# Patient Record
Sex: MALE | Race: WHITE | Employment: UNEMPLOYED | URBAN - METROPOLITAN AREA
[De-identification: names, ages, dates, MRNs, and addresses within clinical notes are randomized per-mention and may not be internally consistent; named-entity substitution may affect disease eponyms.]

---

## 2018-05-28 ENCOUNTER — HOSPITAL ENCOUNTER (EMERGENCY)
Age: 28
Discharge: PSYCHIATRIC OR SUBSTANCE USE FACILITY | End: 2018-05-29
Attending: EMERGENCY MEDICINE
Payer: COMMERCIAL

## 2018-05-28 DIAGNOSIS — F31.10 BIPOLAR DISORDER, MANIC (HCC): ICD-10-CM

## 2018-05-28 DIAGNOSIS — F29 PSYCHOSIS, UNSPECIFIED PSYCHOSIS TYPE (HCC): Primary | ICD-10-CM

## 2018-05-28 PROCEDURE — 99285 EMERGENCY DEPT VISIT HI MDM: CPT

## 2018-05-28 PROCEDURE — 90791 PSYCH DIAGNOSTIC EVALUATION: CPT

## 2018-05-28 NOTE — LETTER
[address] 
[address] [place] [id number] 
[phone] Work/School Note Date: 5/28/2018 To Whom It May concern: 
 
Phillip Asencio was seen and treated today in the emergency room by the following provider(s): 
Attending Provider: Ruth Osler, MD 
Physician Assistant: SANTIAGO Hoyt. Sincerely, Rubia Barrett

## 2018-05-29 VITALS
HEIGHT: 68 IN | WEIGHT: 178 LBS | RESPIRATION RATE: 18 BRPM | SYSTOLIC BLOOD PRESSURE: 114 MMHG | HEART RATE: 56 BPM | DIASTOLIC BLOOD PRESSURE: 70 MMHG | TEMPERATURE: 97.5 F | OXYGEN SATURATION: 100 % | BODY MASS INDEX: 26.98 KG/M2

## 2018-05-29 LAB
ALBUMIN SERPL-MCNC: 3.8 G/DL (ref 3.5–5)
ALBUMIN/GLOB SERPL: 1 {RATIO} (ref 1.1–2.2)
ALP SERPL-CCNC: 63 U/L (ref 45–117)
ALT SERPL-CCNC: 24 U/L (ref 12–78)
AMPHET UR QL SCN: NEGATIVE
ANION GAP SERPL CALC-SCNC: 8 MMOL/L (ref 5–15)
APAP SERPL-MCNC: <2 UG/ML (ref 10–30)
APPEARANCE UR: CLEAR
AST SERPL-CCNC: 19 U/L (ref 15–37)
ATRIAL RATE: 43 BPM
BACTERIA URNS QL MICRO: NEGATIVE /HPF
BARBITURATES UR QL SCN: NEGATIVE
BASOPHILS # BLD: 0 K/UL (ref 0–0.1)
BASOPHILS NFR BLD: 1 % (ref 0–1)
BENZODIAZ UR QL: NEGATIVE
BILIRUB SERPL-MCNC: 0.3 MG/DL (ref 0.2–1)
BILIRUB UR QL: NEGATIVE
BUN SERPL-MCNC: 9 MG/DL (ref 6–20)
BUN/CREAT SERPL: 9 (ref 12–20)
CALCIUM SERPL-MCNC: 8.8 MG/DL (ref 8.5–10.1)
CALCULATED P AXIS, ECG09: 12 DEGREES
CALCULATED R AXIS, ECG10: 55 DEGREES
CALCULATED T AXIS, ECG11: 34 DEGREES
CANNABINOIDS UR QL SCN: POSITIVE
CHLORIDE SERPL-SCNC: 110 MMOL/L (ref 97–108)
CO2 SERPL-SCNC: 24 MMOL/L (ref 21–32)
COCAINE UR QL SCN: NEGATIVE
COLOR UR: ABNORMAL
CREAT SERPL-MCNC: 0.98 MG/DL (ref 0.7–1.3)
DATE LAST DOSE: ABNORMAL
DIAGNOSIS, 93000: NORMAL
DIFFERENTIAL METHOD BLD: NORMAL
DRUG SCRN COMMENT,DRGCM: ABNORMAL
EOSINOPHIL # BLD: 0.1 K/UL (ref 0–0.4)
EOSINOPHIL NFR BLD: 1 % (ref 0–7)
EPITH CASTS URNS QL MICRO: ABNORMAL /LPF
ERYTHROCYTE [DISTWIDTH] IN BLOOD BY AUTOMATED COUNT: 11.5 % (ref 11.5–14.5)
ETHANOL SERPL-MCNC: 16 MG/DL
GLOBULIN SER CALC-MCNC: 3.8 G/DL (ref 2–4)
GLUCOSE SERPL-MCNC: 87 MG/DL (ref 65–100)
GLUCOSE UR STRIP.AUTO-MCNC: NEGATIVE MG/DL
HCT VFR BLD AUTO: 40.4 % (ref 36.6–50.3)
HGB BLD-MCNC: 13.3 G/DL (ref 12.1–17)
HGB UR QL STRIP: NEGATIVE
HYALINE CASTS URNS QL MICRO: ABNORMAL /LPF (ref 0–5)
IMM GRANULOCYTES # BLD: 0 K/UL (ref 0–0.04)
IMM GRANULOCYTES NFR BLD AUTO: 0 % (ref 0–0.5)
KETONES UR QL STRIP.AUTO: NEGATIVE MG/DL
LEUKOCYTE ESTERASE UR QL STRIP.AUTO: NEGATIVE
LITHIUM SERPL-SCNC: 0.58 MMOL/L (ref 0.6–1.2)
LYMPHOCYTES # BLD: 2.3 K/UL (ref 0.8–3.5)
LYMPHOCYTES NFR BLD: 36 % (ref 12–49)
MCH RBC QN AUTO: 31.7 PG (ref 26–34)
MCHC RBC AUTO-ENTMCNC: 32.9 G/DL (ref 30–36.5)
MCV RBC AUTO: 96.4 FL (ref 80–99)
METHADONE UR QL: NEGATIVE
MONOCYTES # BLD: 0.4 K/UL (ref 0–1)
MONOCYTES NFR BLD: 6 % (ref 5–13)
NEUTS SEG # BLD: 3.6 K/UL (ref 1.8–8)
NEUTS SEG NFR BLD: 56 % (ref 32–75)
NITRITE UR QL STRIP.AUTO: NEGATIVE
NRBC # BLD: 0 K/UL (ref 0–0.01)
NRBC BLD-RTO: 0 PER 100 WBC
OPIATES UR QL: NEGATIVE
P-R INTERVAL, ECG05: 188 MS
PCP UR QL: NEGATIVE
PH UR STRIP: 7 [PH] (ref 5–8)
PLATELET # BLD AUTO: 179 K/UL (ref 150–400)
PMV BLD AUTO: 11.2 FL (ref 8.9–12.9)
POTASSIUM SERPL-SCNC: 3.6 MMOL/L (ref 3.5–5.1)
PROT SERPL-MCNC: 7.6 G/DL (ref 6.4–8.2)
PROT UR STRIP-MCNC: ABNORMAL MG/DL
Q-T INTERVAL, ECG07: 504 MS
QRS DURATION, ECG06: 110 MS
QTC CALCULATION (BEZET), ECG08: 425 MS
RBC # BLD AUTO: 4.19 M/UL (ref 4.1–5.7)
RBC #/AREA URNS HPF: ABNORMAL /HPF (ref 0–5)
REPORTED DOSE,DOSE: ABNORMAL UNITS
REPORTED DOSE/TIME,TMG: ABNORMAL
SALICYLATES SERPL-MCNC: <1.7 MG/DL (ref 2.8–20)
SODIUM SERPL-SCNC: 142 MMOL/L (ref 136–145)
SP GR UR REFRACTOMETRY: 1.01 (ref 1–1.03)
UR CULT HOLD, URHOLD: NORMAL
UROBILINOGEN UR QL STRIP.AUTO: 0.2 EU/DL (ref 0.2–1)
VALPROATE SERPL-MCNC: 46 UG/ML (ref 50–100)
VENTRICULAR RATE, ECG03: 43 BPM
WBC # BLD AUTO: 6.4 K/UL (ref 4.1–11.1)
WBC URNS QL MICRO: ABNORMAL /HPF (ref 0–4)

## 2018-05-29 PROCEDURE — 80178 ASSAY OF LITHIUM: CPT | Performed by: PHYSICIAN ASSISTANT

## 2018-05-29 PROCEDURE — 80164 ASSAY DIPROPYLACETIC ACD TOT: CPT | Performed by: PHYSICIAN ASSISTANT

## 2018-05-29 PROCEDURE — 80053 COMPREHEN METABOLIC PANEL: CPT | Performed by: PHYSICIAN ASSISTANT

## 2018-05-29 PROCEDURE — 80307 DRUG TEST PRSMV CHEM ANLYZR: CPT | Performed by: PHYSICIAN ASSISTANT

## 2018-05-29 PROCEDURE — 36415 COLL VENOUS BLD VENIPUNCTURE: CPT | Performed by: PHYSICIAN ASSISTANT

## 2018-05-29 PROCEDURE — 81001 URINALYSIS AUTO W/SCOPE: CPT | Performed by: PHYSICIAN ASSISTANT

## 2018-05-29 PROCEDURE — 85025 COMPLETE CBC W/AUTO DIFF WBC: CPT | Performed by: PHYSICIAN ASSISTANT

## 2018-05-29 PROCEDURE — 93005 ELECTROCARDIOGRAM TRACING: CPT

## 2018-05-29 PROCEDURE — 74011250637 HC RX REV CODE- 250/637: Performed by: PHYSICIAN ASSISTANT

## 2018-05-29 RX ORDER — LORAZEPAM 2 MG/1
2 TABLET ORAL
Status: COMPLETED | OUTPATIENT
Start: 2018-05-29 | End: 2018-05-29

## 2018-05-29 RX ADMIN — LORAZEPAM 2 MG: 2 TABLET ORAL at 02:29
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
## 2018-05-29 NOTE — ED PROVIDER NOTES
HPI Comments: 31 yo male with hx of depression, anxiety, bipolar, ADD, ADHD, aggression, substance abuse, borderline personality and bipolar here for mental health evaluation. States he \"has not friends and no one to talk to\". States he is being \"bullied at [employer] and feeling angry towards co workers. States he has been \"throwing signs in the streets and I just dont care\". Family called EMS after pt sating the was going to hang himself; pt currently denies SI. Followed by Psych and takes Lithium, Depakote, and Ativan. Denies fever, cough, CP, SOB, abd pain, flank pain, urinary symptoms. Patient is a 32 y.o. male presenting with mental health disorder. The history is provided by the patient. Mental Health Problem    This is a recurrent problem. Associated symptoms include agitation. Pertinent negatives include no confusion, no weakness and no numbness.  Mental status baseline is normal.         Past Medical History:   Diagnosis Date    ADD (attention deficit disorder)     ADHD (attention deficit hyperactivity disorder)     Aggressive outburst     Anxiety     Bipolar affective disorder (HCC)     Bipolar affective disorder (HCC)     Borderline personality disorder     Depression     Hypercholesteremia     Mood disorder ([facility] [address]     Psychiatric disorder     Bipolar    Substance abuse     Suicidal thoughts        Past Surgical History:   Procedure Laterality Date    HX WISDOM TEETH EXTRACTION           Family History:   Problem Relation Age of Onset    Arthritis-rheumatoid Maternal Grandmother     Parkinsonism Maternal Grandmother     Diabetes Maternal Grandfather     Other Paternal Grandmother      brain tumor    Pacemaker Paternal Grandfather     Heart Disease Brother 22     Congestive Heart Failure  - Sudden onset       Social History     Social History    Marital status: SINGLE     Spouse name: N/A    Number of children: N/A    Years of education: N/A     Occupational History    Not on file. Social History Main Topics    Smoking status: Former Smoker     Packs/day: 0.50     Types: Cigarettes     Quit date: 5/28/2016    Smokeless tobacco: Former User     Types: Snuff    Alcohol use Yes      Comment: \"2 or fewer times a week\"    Drug use: No    Sexual activity: Not Currently     Other Topics Concern    Not on file     Social History Narrative         ALLERGIES: Review of patient's allergies indicates no known allergies. Review of Systems   Constitutional: Negative. HENT: Negative for ear discharge. Eyes: Negative for photophobia, pain, discharge and visual disturbance. Respiratory: Negative for apnea, cough, chest tightness and shortness of breath. Cardiovascular: Negative for chest pain, palpitations and leg swelling. Gastrointestinal: Negative for abdominal distention, abdominal pain and blood in stool. Genitourinary: Negative for difficulty urinating, dysuria, flank pain, frequency and hematuria. Musculoskeletal: Negative for back pain, gait problem, joint swelling, myalgias and neck pain. Skin: Negative for color change and pallor. Neurological: Negative for dizziness, syncope, weakness, numbness and headaches. Psychiatric/Behavioral: Positive for agitation and behavioral problems. Negative for confusion. The patient is nervous/anxious. Vitals:    05/28/18 2241   BP: 135/80   Pulse: 72   Resp: 18   Temp: 97.3 °F (36.3 °C)   SpO2: 98%   Height: 5' 8\" (1.727 m)            Physical Exam   Constitutional: He is oriented to person, place, and time. He appears well-developed and well-nourished. HENT:   Head: Normocephalic and atraumatic. Right Ear: External ear normal.   Left Ear: External ear normal.   Nose: Nose normal.   Mouth/Throat: Oropharynx is clear and moist.   Eyes: Conjunctivae and EOM are normal. Pupils are equal, round, and reactive to light. Right eye exhibits no discharge. Left eye exhibits no discharge. Neck: Normal range of motion. Neck supple. Cardiovascular: Normal rate, regular rhythm, normal heart sounds and intact distal pulses. Pulmonary/Chest: Effort normal and breath sounds normal.   Abdominal: Soft. Bowel sounds are normal. He exhibits no distension. There is no tenderness. There is no rebound and no guarding. Musculoskeletal: Normal range of motion. He exhibits no edema or tenderness. Neurological: He is alert and oriented to person, place, and time. No cranial nerve deficit. Coordination normal.   Skin: Skin is warm and dry. No rash noted. Psychiatric: His affect is angry. He is agitated. He expresses impulsivity. He expresses no suicidal plans. Nursing note and vitals reviewed. MDM  Number of Diagnoses or Management Options  Bipolar disorder, manic ([facility] [address]):   Psychosis, unspecified psychosis type:      Amount and/or Complexity of Data Reviewed  Clinical lab tests: ordered and reviewed  Discuss the patient with other providers: yes          ED Course       Procedures    [facility] consulted to see pt. SANTIAGO Walker     [facility] in to see. SANTIAGO Walker    Per RN pt became increasingly agitated while on the phone and came out of room and began to throw items; pt then went into room and ripped monitor from the wall throwing it. Code [place] called and security and police in room. Pt handcuffed and ECO. SANTIAGO Walker    Pt thrashing around in room; per safety of pt and staff will put in restraints at this time. SANTIAGO Walker Dr at bedside. SANTIAGO Walker    Behavioral Restraint Face-to-Face Evaluation  (must be completed within one hour of initiation of restraints)      Evaluate immediate situation:  Evaluated     Reaction to intervention: NO    Medical Condition/Assessment: Stable    Behavioral Condition/Assessment: Violent    The patients review of systems, history, medications, and recent labs were reviewed at this time.      Continue/Discontinue restraints at this time: Continue     12:00 AM      Behavioral Restraint Face-to-Face Evaluation  (must be completed within one hour of initiation of restraints)      Evaluate immediate situation:  Evaluated     Reaction to intervention: NO    Medical Condition/Assessment: Stable    Behavioral Condition/Assessment: Violent    The patients review of systems, history, medications, and recent labs were reviewed at this time. Continue/Discontinue restraints at this time: Continue     1:00 AM    Behavioral Restraint Face-to-Face Evaluation  (must be completed within one hour of initiation of restraints)      Evaluate immediate situation:  Evaluated     Reaction to intervention: NO    Medical Condition/Assessment: Stable    Behavioral Condition/Assessment: Violent    The patients review of systems, history, medications, and recent labs were reviewed at this time. Continue/Discontinue restraints at this time: Continue     2:00 AM    [facility] in to see; pt to be TDO. SANTAIGO Pichardo          Behavioral Restraint Face-to-Face Evaluation  (must be completed within one hour of initiation of restraints)      Evaluate immediate situation:  Evaluated     Reaction to intervention: NO    Medical Condition/Assessment: Stable    Behavioral Condition/Assessment: Violent    The patients review of systems, history, medications, and recent labs were reviewed at this time. Continue/Discontinue restraints at this time: Continue     2:57 AM    Pt awaiting crisis to get psych bed placement. SANTIAGO Pichardo     ED EKG interpretation:  Rhythm: sinus tammy. Rate (approx.): 43; Axis: normal; P wave: normal; QRS interval: normal ; ST/T wave: normal. This EKG was interpreted by Dr. J Lius Alvarez and documented by Hammad Hathaway. Willy Katz PA-C,ED Provider. Pt has been more cooperative; will remove restraints but keep in handcuffs at this time. SANTIAGO Pichardo      Pt signed out to Dr J Luis Alvarez awaiting psych placement.  SANTIAGO Pichardo

## 2018-05-29 NOTE — ED TRIAGE NOTES
TRIAGE: Pt arrives via EMS from home for possible SI. Pt's family called EMS saying that pt stated he was going to hang himself. Pt denies SI/HI. Pt has hx bipolar, currently takes Depakote and Lithium, pt reports taking it this morning. Pt contracts for safety in the ED. Pt reports feeling anger towards work peers and states he \"doesn't have his life together and doesn't have any goals or anything. \" Pt also stated that he took 2 Seroquel PTA

## 2018-05-29 NOTE — ED NOTES
Spoke with RN from [facility] and answered questions she had on pt since she did not receive the report a gave to [employer]

## 2018-05-29 NOTE — ED NOTES
Received a call from [facility] from [facility] mental [facility] because Prashant Pyle 34 said they didn't get report on patient.  I spoke with Kathie Wahl and she took report on patient before he left hospital. Will now try a new number they provided [phone]

## 2018-05-29 NOTE — PROGRESS NOTES
Problem: Violent Restraints  Goal: *No harm/injury to patient while restraints in use  Outcome: Progressing Towards Goal  Patient 1:1 with [employer] officer.
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
## 2018-05-29 NOTE — ED NOTES
TRANSFER - OUT REPORT:    Verbal report given to Barbara(name) on Ernst An  being transferred to [facility]) for routine progression of care       Report consisted of patients Situation, Background, Assessment and   Recommendations(SBAR). Information from the following report(s) SBAR and ED Summary was reviewed with the receiving nurse. Lines:       Opportunity for questions and clarification was provided.       Patient transported with: Officers   Tech

## 2019-05-30 ENCOUNTER — OFFICE VISIT (OUTPATIENT)
Dept: FAMILY MEDICINE CLINIC | Age: 29
End: 2019-05-30

## 2019-05-30 VITALS
DIASTOLIC BLOOD PRESSURE: 72 MMHG | TEMPERATURE: 97.9 F | BODY MASS INDEX: 25.76 KG/M2 | OXYGEN SATURATION: 100 % | RESPIRATION RATE: 16 BRPM | SYSTOLIC BLOOD PRESSURE: 122 MMHG | HEIGHT: 68 IN | HEART RATE: 52 BPM | WEIGHT: 170 LBS

## 2019-05-30 DIAGNOSIS — Z00.00 ROUTINE GENERAL MEDICAL EXAMINATION AT A HEALTH CARE FACILITY: Primary | ICD-10-CM

## 2019-05-30 DIAGNOSIS — F41.9 ANXIETY: ICD-10-CM

## 2019-05-30 DIAGNOSIS — Z11.3 SCREEN FOR STD (SEXUALLY TRANSMITTED DISEASE): ICD-10-CM

## 2019-05-30 DIAGNOSIS — F31.76 BIPOLAR DISORDER, IN FULL REMISSION, MOST RECENT EPISODE DEPRESSED (HCC): ICD-10-CM

## 2019-05-30 RX ORDER — DIVALPROEX SODIUM 500 MG/1
TABLET, DELAYED RELEASE ORAL
COMMUNITY
Start: 2016-05-19 | End: 2019-05-30 | Stop reason: SDUPTHER

## 2019-05-30 RX ORDER — GABAPENTIN 400 MG/1
CAPSULE ORAL
Refills: 0 | Status: ON HOLD | COMMUNITY
Start: 2019-04-17 | End: 2020-10-07

## 2019-05-30 RX ORDER — QUETIAPINE FUMARATE 50 MG/1
TABLET, FILM COATED ORAL
COMMUNITY
Start: 2016-05-19 | End: 2019-05-30

## 2019-05-30 NOTE — PATIENT INSTRUCTIONS

## 2019-05-30 NOTE — PROGRESS NOTES
Chief Complaint   Patient presents with    New Patient   [facility] Establish Care     Dx. Mood Disorder   sees Psych for medication management.  Physical     pt is not fasting today. \"REVIEWED RECORD IN PREPARATION FOR VISIT AND HAVE OBTAINED THE NECESSARY DOCUMENTATION\"  1. Have you been to the ER, urgent care clinic since your last visit? Hospitalized since your last visit? Yes Where: [facility] EM[facility] for medication levels Reason for visit: [facility]    2. Have you seen or consulted any other health care providers outside of the [address] [place] [address] since your last visit? Include any pap smears or colon screening.  Yes Where: see above

## 2019-05-30 NOTE — LETTER
NOTIFICATION RETURN TO WORK / SCHOOL 
 
5/30/2019 11:07 AM 
 
Mr. Michelle Burton 2016 [address] [facility] [id number] To Whom It May Concern: 
 
Michelle Burton is currently under the care of NARCISA Mondragon. He will return to work/school on: 5/30/19 If there are questions or concerns please have the patient contact our office. Sincerely, Curtis Manning, NP

## 2019-05-30 NOTE — PROGRESS NOTES
[facility] Note    Checo Galvan is a 25 y.o. male who was seen in clinic today (5/30/2019). Subjective:  Cardiovascular Review:  The patient has no known cardiovascular conditions. Diet and Lifestyle: generally follows a low fat low cholesterol diet, generally follows a low sodium diet, exercises regularly, nonsmoker     Pertinent ROS: taking medications as instructed, no medication side effects noted, no TIA's, no chest pain on exertion, no dyspnea on exertion, no swelling of ankles. Patient seen by psychiatry, NP Will for ADD, anxiety and bipolar disorder. Taking Gabapentin for anxiety but reports treatment has been ineffective. Prior to Admission medications    Medication Sig Start Date End Date Taking? Authorizing Provider   gabapentin (NEURONTIN) 400 mg capsule TK 1 C PO TID 4/17/19  Yes Provider, Historical   lithium carbonate 300 mg capsule TAKE 1 CAPSULE BY MOUTH EVERY MORNING THEN TAKE 2 CAPSULES BY MOUTH EVERY NIGHT AT BEDTIME 4/3/15  Yes Estrella Deal NP   divalproex DR (DEPAKOTE) 500 mg tablet Take 2 tablets by mouth nightly. 10/21/14  Yes Ruperto Chan NP          No Known Allergies        Review of Systems   Constitutional: Negative for malaise/fatigue and weight loss. Respiratory: Negative for shortness of breath. Cardiovascular: Negative for chest pain, palpitations and leg swelling. Gastrointestinal: Negative for heartburn. Musculoskeletal: Negative for back pain, joint pain and myalgias. Neurological: Negative for dizziness, weakness and headaches. Psychiatric/Behavioral: Negative for depression. Objective:   Physical Exam   Constitutional: He is oriented to person, place, and time. He appears well-developed and well-nourished. No distress. HENT:   Right Ear: Tympanic membrane and ear canal normal.   Left Ear: Tympanic membrane and ear canal normal.   Nose: No mucosal edema.  Right sinus exhibits no maxillary sinus tenderness and no frontal sinus tenderness. Left sinus exhibits no maxillary sinus tenderness and no frontal sinus tenderness. Mouth/Throat: Oropharynx is clear and moist.   Eyes: Pupils are equal, round, and reactive to light. EOM are normal.   Neck: Normal range of motion. Neck supple. No JVD present. Carotid bruit is not present. No thyromegaly present. Cardiovascular: Normal rate, regular rhythm, normal heart sounds and intact distal pulses. Exam reveals no gallop and no friction rub. No murmur heard. Pulmonary/Chest: Effort normal and breath sounds normal. No respiratory distress. He has no decreased breath sounds. He has no wheezes. He has no rhonchi. Abdominal: Soft. Bowel sounds are normal. He exhibits no distension. There is no tenderness. Musculoskeletal: He exhibits no edema. Lymphadenopathy:     He has no cervical adenopathy. Neurological: He is alert and oriented to person, place, and time. Psychiatric: He has a normal mood and affect. His behavior is normal.   Nursing note and vitals reviewed. Visit Vitals  /72 (BP 1 Location: Left arm, BP Patient Position: Sitting)   Pulse (!) 52   Temp 97.9 °F (36.6 °C) (Oral)   Resp 16   Ht 5' 8\" (1.727 m)   Wt 170 lb (77.1 kg)   SpO2 100%   BMI 25.85 kg/m²       Assessment & Plan:  Diagnoses and all orders for this visit:    1. Routine general medical examination at a health care facility  Well adult, reviewed routine screenings as well as healthy diet and lifestyle practices  -     CBC W/O DIFF  -     METABOLIC PANEL, COMPREHENSIVE  -     LIPID PANEL    2. Bipolar disorder, in full remission, most [facility] episode [facility]  Managed by psychiatry, no changes. 3. Anxiety  Follow up with psychiatry for management.      4. Screen for STD (sexually transmitted disease)  -     HIV 1/2 AG/AB, 4TH GENERATION,W RFLX CONFIRM  -     HEPATITIS PANEL, ACUTE  -     CT+NG+M GENITALIUM BY MANUELA, UR  -     T PALLIDUM SCREEN W/REFLEX        I have discussed the diagnosis with the patient and the intended plan as seen in the above orders. The patient has received an after-visit summary along with patient information handout. I have discussed medication side effects and warnings with the patient as well. Follow-up and Dispositions    · Return in about 1 year (around 5/30/2020) for Annual Exam - 30 minutes.            Tere Chanel NP

## 2019-06-01 LAB
ALBUMIN SERPL-MCNC: 4.9 G/DL (ref 3.5–5.5)
ALBUMIN/GLOB SERPL: 2 {RATIO} (ref 1.2–2.2)
ALP SERPL-CCNC: 59 IU/L (ref 39–117)
ALT SERPL-CCNC: 12 IU/L (ref 0–44)
AST SERPL-CCNC: 22 IU/L (ref 0–40)
BILIRUB SERPL-MCNC: 0.5 MG/DL (ref 0–1.2)
BUN SERPL-MCNC: 16 MG/DL (ref 6–20)
BUN/CREAT SERPL: 16 (ref 9–20)
C TRACH RRNA UR QL NAA+PROBE: NEGATIVE
CALCIUM SERPL-MCNC: 10.2 MG/DL (ref 8.7–10.2)
CHLORIDE SERPL-SCNC: 102 MMOL/L (ref 96–106)
CHOLEST SERPL-MCNC: 196 MG/DL (ref 100–199)
CO2 SERPL-SCNC: 23 MMOL/L (ref 20–29)
COMMENT, 180044: NORMAL
CREAT SERPL-MCNC: 0.97 MG/DL (ref 0.76–1.27)
ERYTHROCYTE [DISTWIDTH] IN BLOOD BY AUTOMATED COUNT: 12.6 % (ref 12.3–15.4)
GLOBULIN SER CALC-MCNC: 2.5 G/DL (ref 1.5–4.5)
GLUCOSE SERPL-MCNC: 89 MG/DL (ref 65–99)
HAV IGM SERPL QL IA: NEGATIVE
HBV CORE IGM SERPL QL IA: NEGATIVE
HBV SURFACE AG SERPL QL IA: NEGATIVE
HCT VFR BLD AUTO: 42.4 % (ref 37.5–51)
HCV AB S/CO SERPL IA: <0.1 S/CO RATIO (ref 0–0.9)
HDLC SERPL-MCNC: 48 MG/DL
HGB BLD-MCNC: 14.1 G/DL (ref 13–17.7)
HIV 1+2 AB+HIV1 P24 AG SERPL QL IA: NON REACTIVE
INTERPRETATION, 910389: NORMAL
LDLC SERPL CALC-MCNC: 122 MG/DL (ref 0–99)
M GENITALIUM DNA SPEC QL NAA+PROBE: NEGATIVE
MCH RBC QN AUTO: 31.8 PG (ref 26.6–33)
MCHC RBC AUTO-ENTMCNC: 33.3 G/DL (ref 31.5–35.7)
MCV RBC AUTO: 96 FL (ref 79–97)
N GONORRHOEA RRNA UR QL NAA+PROBE: NEGATIVE
PLATELET # BLD AUTO: 238 X10E3/UL (ref 150–450)
POTASSIUM SERPL-SCNC: 4.7 MMOL/L (ref 3.5–5.2)
PROT SERPL-MCNC: 7.4 G/DL (ref 6–8.5)
RBC # BLD AUTO: 4.44 X10E6/UL (ref 4.14–5.8)
SODIUM SERPL-SCNC: 139 MMOL/L (ref 134–144)
T PALLIDUM AB SER QL IA: NEGATIVE
TRIGL SERPL-MCNC: 131 MG/DL (ref 0–149)
VLDLC SERPL CALC-MCNC: 26 MG/DL (ref 5–40)
WBC # BLD AUTO: 5.3 X10E3/UL (ref 3.4–10.8)

## 2020-10-06 ENCOUNTER — HOSPITAL ENCOUNTER (INPATIENT)
Age: 30
LOS: 6 days | Discharge: HOME | DRG: 885 | End: 2020-10-12
Attending: EMERGENCY MEDICINE | Admitting: PSYCHIATRY & NEUROLOGY

## 2020-10-06 DIAGNOSIS — F30.9 MANIA (HCC): Primary | ICD-10-CM

## 2020-10-06 PROBLEM — F31.9 BIPOLAR DISORDER (HCC): Status: ACTIVE | Noted: 2020-10-06

## 2020-10-06 LAB
ALBUMIN SERPL-MCNC: 4.2 G/DL (ref 3.5–5)
ALBUMIN/GLOB SERPL: 1.1 {RATIO} (ref 1.1–2.2)
ALP SERPL-CCNC: 75 U/L (ref 45–117)
ALT SERPL-CCNC: 34 U/L (ref 12–78)
AMPHET UR QL SCN: NEGATIVE
ANION GAP SERPL CALC-SCNC: 7 MMOL/L (ref 5–15)
APAP SERPL-MCNC: <2 UG/ML (ref 10–30)
AST SERPL-CCNC: 26 U/L (ref 15–37)
BARBITURATES UR QL SCN: NEGATIVE
BASOPHILS # BLD: 0 K/UL (ref 0–0.1)
BASOPHILS NFR BLD: 1 % (ref 0–1)
BENZODIAZ UR QL: NEGATIVE
BILIRUB SERPL-MCNC: 0.3 MG/DL (ref 0.2–1)
BUN SERPL-MCNC: 16 MG/DL (ref 6–20)
BUN/CREAT SERPL: 15 (ref 12–20)
CALCIUM SERPL-MCNC: 10 MG/DL (ref 8.5–10.1)
CANNABINOIDS UR QL SCN: POSITIVE
CHLORIDE SERPL-SCNC: 106 MMOL/L (ref 97–108)
CO2 SERPL-SCNC: 25 MMOL/L (ref 21–32)
COCAINE UR QL SCN: NEGATIVE
COMMENT, HOLDF: NORMAL
COVID-19 RAPID TEST, COVR: NOT DETECTED
CREAT SERPL-MCNC: 1.04 MG/DL (ref 0.7–1.3)
DATE LAST DOSE: ABNORMAL
DIFFERENTIAL METHOD BLD: NORMAL
DRUG SCRN COMMENT,DRGCM: ABNORMAL
EOSINOPHIL # BLD: 0.1 K/UL (ref 0–0.4)
EOSINOPHIL NFR BLD: 1 % (ref 0–7)
ERYTHROCYTE [DISTWIDTH] IN BLOOD BY AUTOMATED COUNT: 11.8 % (ref 11.5–14.5)
ETHANOL SERPL-MCNC: <10 MG/DL
GLOBULIN SER CALC-MCNC: 3.9 G/DL (ref 2–4)
GLUCOSE SERPL-MCNC: 109 MG/DL (ref 65–100)
HCT VFR BLD AUTO: 43.6 % (ref 36.6–50.3)
HEALTH STATUS, XMCV2T: NORMAL
HGB BLD-MCNC: 14.3 G/DL (ref 12.1–17)
IMM GRANULOCYTES # BLD AUTO: 0 K/UL (ref 0–0.04)
IMM GRANULOCYTES NFR BLD AUTO: 0 % (ref 0–0.5)
LITHIUM SERPL-SCNC: 0.48 MMOL/L (ref 0.6–1.2)
LYMPHOCYTES # BLD: 1.5 K/UL (ref 0.8–3.5)
LYMPHOCYTES NFR BLD: 20 % (ref 12–49)
MCH RBC QN AUTO: 31.3 PG (ref 26–34)
MCHC RBC AUTO-ENTMCNC: 32.8 G/DL (ref 30–36.5)
MCV RBC AUTO: 95.4 FL (ref 80–99)
METHADONE UR QL: NEGATIVE
MONOCYTES # BLD: 0.4 K/UL (ref 0–1)
MONOCYTES NFR BLD: 6 % (ref 5–13)
NEUTS SEG # BLD: 5.4 K/UL (ref 1.8–8)
NEUTS SEG NFR BLD: 72 % (ref 32–75)
NRBC # BLD: 0 K/UL (ref 0–0.01)
NRBC BLD-RTO: 0 PER 100 WBC
OPIATES UR QL: NEGATIVE
PCP UR QL: NEGATIVE
PLATELET # BLD AUTO: 224 K/UL (ref 150–400)
PMV BLD AUTO: 10.7 FL (ref 8.9–12.9)
POTASSIUM SERPL-SCNC: 3.9 MMOL/L (ref 3.5–5.1)
PROT SERPL-MCNC: 8.1 G/DL (ref 6.4–8.2)
RBC # BLD AUTO: 4.57 M/UL (ref 4.1–5.7)
REPORTED DOSE,DOSE: ABNORMAL UNITS
REPORTED DOSE/TIME,TMG: ABNORMAL
SALICYLATES SERPL-MCNC: <1.7 MG/DL (ref 2.8–20)
SAMPLES BEING HELD,HOLD: NORMAL
SODIUM SERPL-SCNC: 138 MMOL/L (ref 136–145)
SOURCE, COVRS: NORMAL
SPECIMEN SOURCE, FCOV2M: NORMAL
SPECIMEN TYPE, XMCV1T: NORMAL
UR CULT HOLD, URHOLD: NORMAL
VALPROATE SERPL-MCNC: 54 UG/ML (ref 50–100)
WBC # BLD AUTO: 7.4 K/UL (ref 4.1–11.1)

## 2020-10-06 PROCEDURE — 74011250637 HC RX REV CODE- 250/637: Performed by: EMERGENCY MEDICINE

## 2020-10-06 PROCEDURE — 85025 COMPLETE CBC W/AUTO DIFF WBC: CPT

## 2020-10-06 PROCEDURE — 36415 COLL VENOUS BLD VENIPUNCTURE: CPT

## 2020-10-06 PROCEDURE — 80164 ASSAY DIPROPYLACETIC ACD TOT: CPT

## 2020-10-06 PROCEDURE — 80053 COMPREHEN METABOLIC PANEL: CPT

## 2020-10-06 PROCEDURE — 87635 SARS-COV-2 COVID-19 AMP PRB: CPT

## 2020-10-06 PROCEDURE — 74011250637 HC RX REV CODE- 250/637: Performed by: NURSE PRACTITIONER

## 2020-10-06 PROCEDURE — 80307 DRUG TEST PRSMV CHEM ANLYZR: CPT

## 2020-10-06 PROCEDURE — 65220000003 HC RM SEMIPRIVATE PSYCH

## 2020-10-06 PROCEDURE — 80178 ASSAY OF LITHIUM: CPT

## 2020-10-06 PROCEDURE — 99284 EMERGENCY DEPT VISIT MOD MDM: CPT

## 2020-10-06 RX ORDER — ACETAMINOPHEN 325 MG/1
650 TABLET ORAL
Status: DISCONTINUED | OUTPATIENT
Start: 2020-10-06 | End: 2020-10-12 | Stop reason: HOSPADM

## 2020-10-06 RX ORDER — TRAZODONE HYDROCHLORIDE 50 MG/1
50 TABLET ORAL
Status: DISCONTINUED | OUTPATIENT
Start: 2020-10-06 | End: 2020-10-12 | Stop reason: HOSPADM

## 2020-10-06 RX ORDER — BENZTROPINE MESYLATE 1 MG/1
1 TABLET ORAL
Status: DISCONTINUED | OUTPATIENT
Start: 2020-10-06 | End: 2020-10-12 | Stop reason: HOSPADM

## 2020-10-06 RX ORDER — LORAZEPAM 0.5 MG/1
1 TABLET ORAL
Status: COMPLETED | OUTPATIENT
Start: 2020-10-06 | End: 2020-10-06

## 2020-10-06 RX ORDER — LORAZEPAM 2 MG/ML
1 INJECTION INTRAMUSCULAR
Status: DISCONTINUED | OUTPATIENT
Start: 2020-10-06 | End: 2020-10-12 | Stop reason: HOSPADM

## 2020-10-06 RX ORDER — HALOPERIDOL 5 MG/ML
5 INJECTION INTRAMUSCULAR
Status: DISCONTINUED | OUTPATIENT
Start: 2020-10-06 | End: 2020-10-12 | Stop reason: HOSPADM

## 2020-10-06 RX ORDER — OLANZAPINE 5 MG/1
5 TABLET ORAL
Status: DISCONTINUED | OUTPATIENT
Start: 2020-10-06 | End: 2020-10-12 | Stop reason: HOSPADM

## 2020-10-06 RX ORDER — ADHESIVE BANDAGE
30 BANDAGE TOPICAL DAILY PRN
Status: DISCONTINUED | OUTPATIENT
Start: 2020-10-06 | End: 2020-10-12 | Stop reason: HOSPADM

## 2020-10-06 RX ORDER — DIPHENHYDRAMINE HYDROCHLORIDE 50 MG/ML
50 INJECTION, SOLUTION INTRAMUSCULAR; INTRAVENOUS
Status: DISCONTINUED | OUTPATIENT
Start: 2020-10-06 | End: 2020-10-12 | Stop reason: HOSPADM

## 2020-10-06 RX ORDER — IBUPROFEN 200 MG
1 TABLET ORAL DAILY
Status: DISCONTINUED | OUTPATIENT
Start: 2020-10-07 | End: 2020-10-12 | Stop reason: HOSPADM

## 2020-10-06 RX ORDER — HYDROXYZINE 50 MG/1
50 TABLET, FILM COATED ORAL
Status: DISCONTINUED | OUTPATIENT
Start: 2020-10-06 | End: 2020-10-12 | Stop reason: HOSPADM

## 2020-10-06 RX ADMIN — LORAZEPAM 1 MG: 0.5 TABLET ORAL at 20:43

## 2020-10-06 RX ADMIN — HYDROXYZINE HYDROCHLORIDE 50 MG: 50 TABLET, FILM COATED ORAL at 23:51

## 2020-10-06 RX ADMIN — TRAZODONE HYDROCHLORIDE 50 MG: 50 TABLET ORAL at 23:51

## 2020-10-06 NOTE — BSMART NOTE
Comprehensive Assessment Form Part 1 Section I - Disposition Axis I - Bipolar Disorder Axis II - Deferred Axis III - Past Medical History:  
Diagnosis Date  ADHD (attention deficit hyperactivity disorder)  Anxiety  Bipolar affective disorder ([facility]  Borderline personality disorder ([facility]  Depression  Mood disorder ([facility]  Substance abuse ([facility]  Suicidal thoughts The Medical Doctor to Psychiatrist conference was not completed. The Medical Doctor is in agreement with Psychiatrist disposition because of (reason) Voluntary admission is recommended and patient is currently willing. The plan is present for admission pending medical clearance. The on-call Psychiatrist consulted was Dr. Bill Finnegan. The admitting Psychiatrist will be Dr. Vladimir Baker. The admitting Diagnosis is Bipolar Disorder. The Payor source is [facility]. Section II - Integrated Summary Summary:  Patient came in for mental health evaluation after pushing trash cans over in his neighborhood and someone called the police. Patient reported he does not feel he is on the right medications for his Bipolar Disorder and feels it is a good sign that he is here. Patient reported his behavior has been erratic since starting Zoloft 2 weeks ago. Patient indicated he noticed his performance and behavior has changed at work. He indicated the same thing happened when he started Wellbutrin. He reported he hasn't felt well mentally and it started in January and has been declining until the \"explosion\" today. He indicated he feels depressed and manic at the same time, \"like its mixed. \"   
 
Patient currently seeing Courtney Cruz NP at [facility] Patient is reporting he is on Lithium, Depakote, Vistaril, and Zoloft. Patient indicated he is taking \"exactly as prescribed. \"  He does not feel like the medication is working or seeing Courtney Cruz NP virtually is working. Patient stated \"Something has to change. \"  Patient has had several past admissions at [facility] and [facility]. He saw a Therapist 1x at [facility] in August but did not go back. Patient is alert, oriented, and cooperative. Speech is pressured and patient is tangential.  Eye contact is good. Appearance is neat and clean. Patient reported he feels depressed but presents as manic. Patient reported poor appetite and poor sleep. Patient indicated when he sleeps, he sleeps until noon and then he doesn't want to get up and he \"goes through the motions. \"  Patient denied any hallucinations and does not appear to be hallucinating. Patient denied current suicidal or homicidal ideation but stated he is at a \"low point. \"  Patient also stated he feels as if he has been on a \"roller coaster\" since January. Patient reported he is using alcohol to [employer] and has had some Fireball whiskey today. He is stating he is willing to be admitted and requested a prn for anxiety. Advised Dr Mina Schirmer who indicated he would order something for him. Of note in 2018 when patient was here mom reported \"He's been out of control, destroying property, threatening other people, and drinking alcohol. When he mixes alcohol with his meds (lithium and Depakote) things go bad. \" Also taken from [address] at that time \"Patient presents as manic, restless, agitated, and aggressive and demonstrates violent outbursts as evidenced by ripping the monitor off the wall, throwing a chair against the med cabinet, flipping med cabinet over, and trying to break the phone. \"  Edmond Vega and will inform charge nurse and officer. The patienthas demonstrated mental capacity to provide informed consent. The information is given by the patient and past medical records. The Chief Complaint is depression/arpit. The Precipitant Factors are medication change. Previous Hospitalizations:  Yes 
 The patient has been in restraints in the past and has not escaped from them. Current Psychiatrist and/or  is Rajni Campbell NP. Lethality Assessment: 
 
The potential for suicide noted by the following:Patient denied suicidal ideation and denied history of attempts . The potential for homicide is not noted. The patient has not been a perpetrator of sexual or physical abuse. There are not pending charges. The patient is not felt to be at risk for self harm or harm to others. The attending nurse was advised that security has been notified. Section III - Psychosocial 
The patient's overall mood and attitude is anxious and depressed. Feelings of helplessness and hopelessness are not observed. Generalized anxiety is not observed. Panic is not observed. Phobias are not observed. Obsessive compulsive tendencies are not observed. Section IV - Mental Status Exam 
The patient's appearance shows no evidence of impairment. The patient's behavior shows no evidence of impairment. The patient is oriented to time, place, person and situation. The patient's speech is pressured. The patient's mood is depressed and is anxious. The range of affect is constricted. The patient's thought content demonstrates no evidence of impairment. The thought process shows no evidence of impairment. The patient's perception shows no evidence of impairment. The patient's memory shows no evidence of impairment. The patient's appetite is decreased. The patient's sleep has evidence of insomnia. The patient shows no insight. The patient's judgement is psychologically impaired. Section V - Substance Abuse The patient is using substances. The patient is using alcohol for unknown with last use on today. The patient has experienced the following withdrawal symptoms: N/A. Section VI - Living Arrangements The patient is single. The patient lives with roommates.  The patient has no children. The patient does plan to return home upon discharge. The patient does not have legal issues pending. The patient's source of income comes from employment. Protestant and cultural practices have not been voiced at this time. The patient's greatest support comes from parents and this person will not be involved with the treatment. The patient has not been in an event described as horrible or outside the realm of ordinary life experience either currently or in the past. 
The patient has not been a victim of sexual/physical abuse. Section VII - Other Areas of Clinical Concern The highest grade achieved is some college with the overall quality of school experience being described as \"poor\". The patient is currently employed and speaks [place] as a primary language. The patient has no communication impairments affecting communication. The patient's preference for learning can be described as: can read and write adequately. The patient's hearing is normal.  The patient's vision is impaired and  wears glasses or contacts.  
 
 
Lynda Will, LPC

## 2020-10-06 NOTE — ED PROVIDER NOTES
HPI .  Patient has a history of ADHD, bipolar disorder, borderline personality, and substance abuse. Patient feels like he is depressed today. He feels like he is manic certain times of the day and then switches to depressed during other parts of the day. He reports having trouble sleeping. He takes Vistaril or Benadryl at bedtime. He says if he falls asleep he will sleep to noon but often he has a lot of trouble falling to sleep. He reports compliance with his medications. He says he is only drinking a small amount of alcohol. However when he was throwing his neighbors trash cans down the street he says it was because he was drunk. He says police came and asked him about the trash cans and he decided to come to the hospital.  He does not think his medications are working. He has no suicidal ideation today but he says he was thinking about harming himself by choking himself out several days ago.     Past Medical History:   Diagnosis Date    ADHD (attention deficit hyperactivity disorder)     Anxiety     Bipolar affective disorder (HCC)     Borderline [facility]     Depression     Mood disorder (HCC)     Substance abuse ([facility]     Suicidal thoughts        Past Surgical History:   Procedure Laterality Date    HX WISDOM TEETH EXTRACTION           Family History:   Problem Relation Age of Onset    Arthritis-rheumatoid Maternal Grandmother     Parkinsonism Maternal Grandmother     Diabetes Maternal Grandfather     Other Paternal Grandmother         brain tumor    Pacemaker Paternal Grandfather     Heart Disease Brother 22        Congestive Heart Failure  - Sudden onset       Social History     Socioeconomic History    Marital status: SINGLE     Spouse name: Not on file    Number of children: Not on file    Years of education: Not on file    Highest education level: Not on file   Occupational History    Not on file   Social Needs    Financial resource strain: Not on file   [facility] Food insecurity     Worry: Not on file     Inability: Not on file    Transportation needs     Medical: Not on file     Non-medical: Not on file   Tobacco Use    Smoking status: Former Smoker     Packs/day: 0.50     Types: Cigarettes     Last attempt to quit: 2016     Years since quittin.3    Smokeless tobacco: Former User     Types: Snuff   Substance and Sexual Activity    Alcohol use: Not Currently    Drug use: No    Sexual activity: Not Currently   Lifestyle    Physical activity     Days per week: Not on file     Minutes per session: Not on file    Stress: Not on file   Relationships    Social connections     Talks on phone: Not on file     Gets together: Not on file     Attends Faith service: Not on file     Active member of club or organization: Not on file     Attends meetings of clubs or organizations: Not on file     Relationship status: Not on file    Intimate partner violence     Fear of current or ex partner: Not on file     Emotionally abused: Not on file     Physically abused: Not on file     Forced sexual activity: Not on file   Other Topics Concern    Not on file   Social History Narrative    Not on file         ALLERGIES: Patient has no known allergies. Review of Systems   All other systems reviewed and are negative. There were no vitals filed for this visit. Physical Exam  Vitals signs and nursing note reviewed. Constitutional:       Appearance: He is well-developed. HENT:      Head: Normocephalic and atraumatic. Eyes:      Pupils: Pupils are equal, round, and reactive to light. Neck:      Musculoskeletal: Normal range of motion and neck supple. Cardiovascular:      Rate and Rhythm: Normal rate and regular rhythm. Heart sounds: Normal heart sounds. No murmur. No friction rub. No gallop. Pulmonary:      Effort: Pulmonary effort is normal. No respiratory distress. Breath sounds: No wheezing or rales.    Abdominal:      Palpations: Abdomen is soft. Tenderness: There is no abdominal tenderness. There is no rebound. Musculoskeletal: Normal range of motion. General: No tenderness. Skin:     Findings: No erythema. Neurological:      Mental Status: He is alert. Cranial Nerves: No cranial nerve deficit.       Comments: Motor; symmetric   Psychiatric:      Comments: Affect; anxious; behavior; cooperative; cognition; understands questions and gives logical answers          MDM       Procedures

## 2020-10-07 LAB
CHOLEST SERPL-MCNC: 170 MG/DL
GLUCOSE P FAST SERPL-MCNC: 103 MG/DL (ref 65–100)
HDLC SERPL-MCNC: 42 MG/DL
HDLC SERPL: 4 {RATIO} (ref 0–5)
LDLC SERPL CALC-MCNC: 105.4 MG/DL (ref 0–100)
LIPID PROFILE,FLP: ABNORMAL
SARS-COV-2, COV2: NOT DETECTED
SPECIMEN SOURCE, FCOV2M: NORMAL
TRIGL SERPL-MCNC: 113 MG/DL (ref ?–150)
VLDLC SERPL CALC-MCNC: 22.6 MG/DL

## 2020-10-07 PROCEDURE — 74011250637 HC RX REV CODE- 250/637: Performed by: NURSE PRACTITIONER

## 2020-10-07 PROCEDURE — 82947 ASSAY GLUCOSE BLOOD QUANT: CPT

## 2020-10-07 PROCEDURE — 80061 LIPID PANEL: CPT

## 2020-10-07 PROCEDURE — 65220000003 HC RM SEMIPRIVATE PSYCH

## 2020-10-07 PROCEDURE — 36415 COLL VENOUS BLD VENIPUNCTURE: CPT

## 2020-10-07 PROCEDURE — 74011250637 HC RX REV CODE- 250/637: Performed by: PSYCHIATRY & NEUROLOGY

## 2020-10-07 RX ORDER — LITHIUM CARBONATE 300 MG/1
600 CAPSULE ORAL
COMMUNITY
End: 2020-10-12

## 2020-10-07 RX ORDER — LITHIUM CARBONATE 300 MG/1
600 CAPSULE ORAL
Status: DISCONTINUED | OUTPATIENT
Start: 2020-10-07 | End: 2020-10-09

## 2020-10-07 RX ORDER — QUETIAPINE FUMARATE 100 MG/1
100 TABLET, FILM COATED ORAL
Status: DISCONTINUED | OUTPATIENT
Start: 2020-10-07 | End: 2020-10-08

## 2020-10-07 RX ORDER — DIVALPROEX SODIUM 500 MG/1
TABLET, DELAYED RELEASE ORAL
COMMUNITY
End: 2020-10-12

## 2020-10-07 RX ORDER — SERTRALINE HYDROCHLORIDE 50 MG/1
50 TABLET, FILM COATED ORAL DAILY
COMMUNITY
End: 2020-10-12

## 2020-10-07 RX ORDER — DIVALPROEX SODIUM 500 MG/1
1500 TABLET, EXTENDED RELEASE ORAL
Status: DISCONTINUED | OUTPATIENT
Start: 2020-10-07 | End: 2020-10-12 | Stop reason: HOSPADM

## 2020-10-07 RX ORDER — HYDROXYZINE 50 MG/1
50 TABLET, FILM COATED ORAL
COMMUNITY
End: 2020-10-12

## 2020-10-07 RX ADMIN — TRAZODONE HYDROCHLORIDE 50 MG: 50 TABLET ORAL at 22:03

## 2020-10-07 RX ADMIN — HYDROXYZINE HYDROCHLORIDE 50 MG: 50 TABLET, FILM COATED ORAL at 18:00

## 2020-10-07 RX ADMIN — LITHIUM CARBONATE 600 MG: 300 CAPSULE ORAL at 21:14

## 2020-10-07 RX ADMIN — DIVALPROEX SODIUM 1500 MG: 500 TABLET, EXTENDED RELEASE ORAL at 21:14

## 2020-10-07 RX ADMIN — QUETIAPINE FUMARATE 100 MG: 100 TABLET ORAL at 21:14

## 2020-10-07 NOTE — H&P
[facility] HISTORY AND PHYSICAL    Name:  Auther Hammans  MR#:  [id number]  :  1990  ACCOUNT #:  [de-identified]  ADMIT DATE:  10/06/2020      INITIAL PSYCHIATRIC INTERVIEW    CHIEF COMPLAINT:  \"I feel like a manic. \"    HISTORY OF PRESENT ILLNESS:  The patient is a 53-year-old  man who is currently brought to the ED by police. He had reportedly been agitated, pushed over trash cans his neighborhood and had been acting in a bizarre manner. After his arrival on the unit, he has been talking rapidly, keeps saying that his thoughts are racing and his activity levels are increased. Describes himself as feeling like he is going to explode. States that after he was admitted the hospital he felt somewhat better. Valproic level on admission was 54 and lithium was 0.48, but these do not appear to be trough levels. He states that his condition worsened after he was started 2 weeks ago on Zoloft by his outpatient provider, Ms. Darne Mendoza, who is a nurse practitioner. States that he has felt dysphoric with the Zoloft, felt that his thoughts were racing and he could not slow them down and he struggled to sleep. He has felt more impulsive and gets easily agitated and irritable. However, denies any aggression or violence in the last 2 weeks. He gives a history of a similar episode when he was started on Wellbutrin several years ago. PAST MEDICAL HISTORY:  Reviewed as per the history and physical exam.    PAST PSYCHIATRIC HISTORY:  The patient reports that he was diagnosed with bipolar disorder many years ago and has had several psychiatric hospitalizations including at [facility] as well as [facility].  There is a history of occasional THC use and his urine drug screen was positive for this. Denies any prior history of suicide attempts. As noted above, there is a history of manic episode developing after he was started on Wellbutrin.     PSYCHOSOCIAL HISTORY:  The [place] where he lives with three other roommates. He is single, has never been  and does not have any children. He was born in [place], but grew up for the most part in the [place] area. His parents live in [place] also and appear to be quite supportive. 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 Denies any major legal or financial stressors. MENTAL STATUS EXAM:  The patient is a young  man who is dressed in hospital apparel. He is calm and cooperative during the interview, but speech output is increased and he speaks rapidly. Psychomotor activity is mildly increased. He is somewhat fidgety. Speech is spontaneous and coherent. Mood is reported as being irritable, but affect is reactive and he was calm and pleasant during the interview. Denies any active suicidal ideation or plan. Denies any perceptual abnormalities. Denies any delusions. His thought process is mildly disorganized at times. Cognitively, he is awake and alert, oriented to time, place, and person. Intelligence is average, memory is intact, and fund of knowledge is adequate. Insight is partial.  Judgment is poor. ASSESSMENT AND PLAN/DIAGNOSIS:  Bipolar 1 disorder, most recent episode arpit without psychotic symptoms, antidepressant induced. I will continue his inpatient stay. He will be provided with support and attend groups. Estimated length of stay is 5-7 days. His strengths include his ability to seek help and support from his family.       MD TIANA [email or web address]  D:  10/07/2020 15:29  T:  10/07/2020 17:07  JOB #:  [id number]

## 2020-10-07 NOTE — BH NOTES
GROUP THERAPY PROGRESS NOTE    Patient is participating in Coping Skills Group. Group time: 50 minutes    Personal goal for participation: Practice and plan for self-care    Goal orientation: Personal    Group therapy participation: active    Therapeutic interventions reviewed and discussed: Group discussion on the meaning and importance of self-care. Examining the self-care wheel in six different dimensions: physical, psychological, professional, personal, spiritual, and emotional. Members of this group will create a self-care strategy schedule to incorporate into their daily routine. Impression of participation: Wei Fontana was actively engaged in group this afternoon. He participated in the activity and appeared to be detailed in his responses. Calm, cooperative, and pleasant. He created a self-care schedule of things he will do and focused on going to therapy and taking his medication. Patient also expressed he realized he does not want to drink anymore.      Leigh Gonzalez, Supervisee in Social Work

## 2020-10-07 NOTE — PROGRESS NOTES
Problem: Altered Thought Process (Adult/Pediatric)  Goal: *STG: Remains safe in hospital  Outcome: Progressing Towards Goal  Goal: *STG: Absence of lethality  Outcome: Progressing Towards Goal     Problem: Falls - Risk of  Goal: *Absence of Falls  Description: Document Agapito Fall Risk and appropriate interventions in the flowsheet.   Outcome: Progressing Towards Goal  Note: Fall Risk Interventions:            PRN Medication Documentation    Specific patient behavior that led to need for PRN medication: pt requesting medication to help alleviate anxiety and help promote sleep  Staff interventions attempted prior to PRN being given: education, reassurance, decreased stimuli  PRN medication given: atarax & trazodone  Patient response/effectiveness of PRN medication: will assess        Blocked Bed Documentation:    Room number: 736  Type: Behavior  Rationale: Poor Self-Control  Anticipated duration: TBD in treatment team

## 2020-10-07 NOTE — BH NOTES
PSYCHOSOCIAL ASSESSMENT  :Patient identifying info:  Wilber Wylie is a 26 y.o., male admitted 10/6/2020  6:25 PM     Presenting problem and precipitating factors:  He was admitted voluntarily due to agitation and not feeling right on his medications. Police were called after he pushed over trash cans in his neighborhood. Stated he has being feeling out of control since he was started on Zoloft 2 weeks ago. Not sleeping , feeling manic and out of control. Mental status assessment:alert, oriented x's 3 , pleasant, compliant , speech was pressured, denies any delusional thoughts ,     Strengths: willingness to seek treatment- established outpatient providers     Collateral information:     Current psychiatric /substance abuse providers and contact info: Yonis Flores NP     Previous psychiatric/substance abuse providers and response to treatment: he has had several other admissions , [facility], [facility] and now [facility]     Family history of mental illness or substance abuse: unknown     Substance abuse history:    Social History     Tobacco Use    Smoking status: Current Some Day Smoker     Packs/day: 0.50     Types: Cigarettes     Last attempt to quit: 2016     Years since quittin.3    Tobacco comment: \"smokes cigars\"   Substance Use Topics    Alcohol use: Not Currently       History of biomedical complications associated with substance abuse :none noted     Patient's current acceptance of treatment or motivation for change:good     Family constellation: single , no children     Is significant other involved?        Describe support system:     Describe living arrangements and home environment:lives with other roommates in a house     Health issues:   Hospital Problems  Date Reviewed: 2019          Codes Class Noted POA    Bipolar disorder [facility]) ICD-10-CM: F31.9  ICD-9-CM: 296.80  10/6/2020 Unknown              Trauma history: none noted     Legal issues: no  History of  service: no Financial status: employed at [employer]     Baptist/cultural factors: none noted     Education/work history: college     Have you been licensed as a health care professional (current or ): no     Leisure and recreation preferences: unknown     Describe coping skills:wilfridectbaron Horton  10/7/2020

## 2020-10-07 NOTE — BH NOTES
Behavioral Health Interdisciplinary Rounds     Patient Name: Irene Guerra  Age: 26 y.o.   Room/Bed:  6/  Primary Diagnosis: <principal problem not specified>   Admission Status: Voluntary     Readmission within 30 days: no  Power of  in place: no  Patient requires a blocked bed: no          Reason for blocked bed:     VTE Prophylaxis: Not indicated    Mobility needs/Fall risk: no  Flu Vaccine : no   Nutritional Plan: no  Consults:          Labs/Testing due today?:     Sleep hours: new admission        Participation in Care/Groups:  yes  Medication Compliant?: Yes  PRNS (last 24 hours):     Restraints (last 24 hours):  no     CIWA (range last 24 hours):     COWS (range last 24 hours):      Alcohol screening (AUDIT) completed -   AUDIT Score: 3     If applicable, date SBIRT discussed in treatment team AND documented:   AUDIT Screen Score: AUDIT Score: 3  Tobacco - patient is a smoker: Have You Used Tobacco in the Past 30 Days: Yes  Illegal Drugs use: Have You Used Any Illegal Substances Over the Past 12 Months: Yes    24 hour chart check complete: no     Patient goal(s) for today:   Treatment team focus/goals: Plan to assess for medication and discharge needs  Progress note:He was pleasant and compliant with treatment team.  Speech was pressured and he has been compliant with his treatment     LOS:  1  Expected LOS: TBD    Financial concerns/prescription coverage:  [facility]   Family contact:      Family requesting physician contact today:    Discharge plan:he will return home with roommates   Access to weapons :  no      Outpatient provider(s):Kimberly Solis NP   Patient's preferred phone number for follow up call :     Participating treatment team members: CAMDEN Plunkett Dr., RN

## 2020-10-07 NOTE — BH NOTES
Pt received voluntary to acute unit  Pt denies SI/HI and any psychotic symptoms. Pt states has been feeling, \"manic & depressed with erratic behavior\" and reports being med compliant (lithium level 0.48)  Pt does endorse ETOH use and smoking cigars. UDS + THC  Pt has been hospitalized before at [facility] and [facility]    Skin assessment completed by Reji Perez RN and Kateryna Mejia RN. No wounds or skin impairments noted. WDL.

## 2020-10-07 NOTE — PROGRESS NOTES
Laboratory Monitoring for Antipsychotics: This patient is currently prescribed the following medication(s):   Current Facility-Administered Medications   Medication Dose Route Frequency    lithium carbonate capsule 600 mg  600 mg Oral QHS    divalproex ER (DEPAKOTE ER) 24 hour tablet 1,500 mg  1,500 mg Oral QHS    QUEtiapine (SEROquel) tablet 100 mg  100 mg Oral QHS    nicotine (NICODERM CQ) 14 mg/24 hr patch 1 Patch  1 Patch TransDERmal DAILY     The following labs have been completed for monitoring of antipsychotics and/or mood stabilizers:    Height, Weight, BMI Estimation  Estimated body mass index is 25.85 kg/m² as calculated from the following:    Height as of 5/30/19: 172.7 cm (68\"). Weight as of 5/30/19: 77.1 kg (170 lb). Vital Signs/Blood Pressure  Visit Vitals  /61 (BP 1 Location: Left arm, BP Patient Position: Sitting)   Pulse 70   Temp 97.9 °F (36.6 °C)   Resp 16   SpO2 96%     Renal Function, Hepatic Function and Chemistry  CrCl cannot be calculated (Unknown ideal weight.). Lab Results   Component Value Date/Time    Sodium 138 10/06/2020 06:13 PM    Potassium 3.9 10/06/2020 06:13 PM    Chloride 106 10/06/2020 06:13 PM    CO2 25 10/06/2020 06:13 PM    Anion gap 7 10/06/2020 06:13 PM    BUN 16 10/06/2020 06:13 PM    Creatinine 1.04 10/06/2020 06:13 PM    BUN/Creatinine ratio 15 10/06/2020 06:13 PM    Bilirubin, total 0.3 10/06/2020 06:13 PM    Protein, total 8.1 10/06/2020 06:13 PM    Albumin 4.2 10/06/2020 06:13 PM    Globulin 3.9 10/06/2020 06:13 PM    A-G Ratio 1.1 10/06/2020 06:13 PM    ALT (SGPT) 34 10/06/2020 06:13 PM    AST (SGOT) 26 10/06/2020 06:13 PM    Alk.  phosphatase 75 10/06/2020 06:13 PM     Lab Results   Component Value Date/Time    Glucose 103 (H) 10/07/2020 05:41 AM    Glucose (POC) 98 07/28/2013 11:57 AM     No results found for: HBA1C, HGBE8, ODT2QTUR    Hematology  Lab Results   Component Value Date/Time    WBC 7.4 10/06/2020 06:13 PM    RBC 4.57 10/06/2020 06:13 PM HGB 14.3 10/06/2020 06:13 PM    HCT 43.6 10/06/2020 06:13 PM    MCV 95.4 10/06/2020 06:13 PM    MCH 31.3 10/06/2020 06:13 PM    MCHC 32.8 10/06/2020 06:13 PM    RDW 11.8 10/06/2020 06:13 PM    PLATELET 830 84/42/5040 06:13 PM     Lipids  Lab Results   Component Value Date/Time    Cholesterol, total 170 10/07/2020 05:41 AM    HDL Cholesterol 42 10/07/2020 05:41 AM    LDL, calculated 105.4 (H) 10/07/2020 05:41 AM    Triglyceride 113 10/07/2020 05:41 AM    CHOL/HDL Ratio 4.0 10/07/2020 05:41 AM     Thyroid Function  Lab Results   Component Value Date/Time    TSH 0.92 12/26/2012 07:15 AM     Assessment/Plan:  Will request documentation of height/weight for the calculation of BMI to complete the recommended baseline laboratory monitoring based on the patient's current medication regimen. No further interventions are needed at this time. Follow-up metabolic monitoring labs should be completed in 3 months (quarterly for the first year of antipsychotic therapy).      Karma Ngo, OSMIND

## 2020-10-07 NOTE — BH NOTES
GROUP THERAPY PROGRESS NOTE    Patient is participating in Process Group. Group time: 50 minutes    Personal goal for participation: increases communication skills by developing self-awareness of self-defeating statements. Goal orientation: Personal    Group therapy participation: active      Therapeutic interventions reviewed and discussed: Group discussion on the phrases Ting Kansky, coulda, shoulda that are made after a situation has occurred and can place unnecessary pressure on an individual. The members gave examples and led a discussion on self-defeating statements and possible ways to reframe it. Each member then wrote three self-defeating statements, and reframed it using positive self-talk. Group discussion and members reviewed personal strength and self-control after activity. Impression of participation: Ismael Oseguera actively participated in group today. He was calm and pleasant, and expressed he was at his breaking point and realized he needed a change. Patient completely participated in the activity and shared his responses. Has good insight on situation and goals. Encouraged to give therapy a chance, and patient expressed he has a good job now and that is what keeps him going.      Leigh Gonzalez, Supervisee in Social Work

## 2020-10-07 NOTE — PROGRESS NOTES
[facility]        Date Treatment Plan Initiated:  10/6/20      Treatment Plan Modalities:    Type of Modality Amount  (x minutes) Frequency (x/week) Duration (x days) Name of Responsible Staff   Community & wrap-up meetings to encourage peer interactions    15    7    1     ASHLEY Guillen   Group psychotherapy to assist in building coping skills and internal controls    60    7    1    [facility]   Therapeutic activity groups to build coping skills    60    7    1    [facility]   Psychoeducation in group setting to address:   Medication education    15    7    1    Carola MILLER RN   Coping skills    20    7    1    Gilford Reilly, RN   Relaxation techniques           Symptom management           Discharge planning    15    7    1    Morelia Negro    Spirituality     60    7    1    sheeba FRANZI    60    7    1    Volunteer from [employer] [facility]/AA/NA    60    7    1    Volunteer from [address] [place] [address] medication management    15    7    1    Dr. Olivier Lunsford   Family meeting/discharge planning                           Problem: Altered Thought Process (Adult/Pediatric)  Goal: *STG: Participates in treatment plan  Outcome: Progressing Towards Goal  Note: Pt has eaten breakfast and returned to room to sleep. Denies SI. Pt very anxious but cooperative. Hyper focused on getting in touch in boss. Goal: *STG: Remains safe in hospital  Outcome: Progressing Towards Goal  Goal: *STG: Seeks staff when feelings of anxiety and fear arise  Outcome: Progressing Towards Goal  Goal: *STG: Complies with medication therapy  Outcome: Progressing Towards Goal     Problem: Patient Education: Go to Patient Education Activity  Goal: Patient/Family Education  Outcome: Progressing Towards Goal     Problem: Falls - Risk of  Goal: *Absence of Falls  Description: Document Agapito Fall Risk and appropriate interventions in the flowsheet.   Outcome: Progressing Towards Goal  Note: Fall Risk Interventions:    Medication Interventions: Teach patient to arise slowly

## 2020-10-07 NOTE — PROGRESS NOTES
Blocked Bed Documentation:    Room number: 298-80  Type: Behavior  Rationale: Physical Aggression   Anticipated duration: hospital duration    Patient educated on fall precautions while taking sedative medications. Will continue to educate and monitor. Problem: Falls - Risk of  Goal: *Absence of Falls  Description: Document Kasandra Clemons Fall Risk and appropriate interventions in the flowsheet.   Outcome: Progressing Towards Goal  Note: Fall Risk Interventions:            Medication Interventions: Teach patient to arise slowly, Evaluate medications/consider consulting pharmacy

## 2020-10-08 PROCEDURE — 65220000003 HC RM SEMIPRIVATE PSYCH

## 2020-10-08 PROCEDURE — 74011250637 HC RX REV CODE- 250/637: Performed by: NURSE PRACTITIONER

## 2020-10-08 PROCEDURE — 74011250637 HC RX REV CODE- 250/637: Performed by: PSYCHIATRY & NEUROLOGY

## 2020-10-08 RX ORDER — QUETIAPINE FUMARATE 100 MG/1
200 TABLET, FILM COATED ORAL
Status: DISCONTINUED | OUTPATIENT
Start: 2020-10-08 | End: 2020-10-12 | Stop reason: HOSPADM

## 2020-10-08 RX ADMIN — DIVALPROEX SODIUM 1500 MG: 500 TABLET, EXTENDED RELEASE ORAL at 21:09

## 2020-10-08 RX ADMIN — TRAZODONE HYDROCHLORIDE 50 MG: 50 TABLET ORAL at 21:10

## 2020-10-08 RX ADMIN — LITHIUM CARBONATE 600 MG: 300 CAPSULE ORAL at 21:09

## 2020-10-08 RX ADMIN — QUETIAPINE FUMARATE 200 MG: 100 TABLET ORAL at 21:10

## 2020-10-08 RX ADMIN — HYDROXYZINE HYDROCHLORIDE 50 MG: 50 TABLET, FILM COATED ORAL at 14:22

## 2020-10-08 NOTE — BH NOTES
Chief Complaint:  I feel okay today    Length of Stay: 2 Days    Interval History:  Gabriela Momin is slightly better today. Says it took him a while to get to sleep but was able to sleep well after that. He has been attending groups and is talking less now. Denies any adverse events with his medications currently. Pleasant and calm during the interview. No SI or plan is present today. Past Medical History:  Past Medical History:   Diagnosis Date    ADHD (attention deficit hyperactivity disorder)     Anxiety     Bipolar affective disorder ([place] [address].)     Borderline personality disorder ([place] [address].)     Depression     Mood disorder (HCC)     Substance abuse ([place] [address].)     Suicidal thoughts            Labs:  Lab Results   Component Value Date/Time    WBC 7.4 10/06/2020 06:13 PM    Hemoglobin (POC) 13.9 07/28/2013 11:57 AM    HGB 14.3 10/06/2020 06:13 PM    Hematocrit (POC) 41 07/28/2013 11:57 AM    HCT 43.6 10/06/2020 06:13 PM    PLATELET 180 29/00/8266 06:13 PM    MCV 95.4 10/06/2020 06:13 PM      Lab Results   Component Value Date/Time    Sodium 138 10/06/2020 06:13 PM    Potassium 3.9 10/06/2020 06:13 PM    Chloride 106 10/06/2020 06:13 PM    CO2 25 10/06/2020 06:13 PM    Anion gap 7 10/06/2020 06:13 PM    Glucose 103 (H) 10/07/2020 05:41 AM    BUN 16 10/06/2020 06:13 PM    Creatinine 1.04 10/06/2020 06:13 PM    BUN/Creatinine ratio 15 10/06/2020 06:13 PM    GFR est AA >60 10/06/2020 06:13 PM    GFR est non-AA >60 10/06/2020 06:13 PM    Calcium 10.0 10/06/2020 06:13 PM    Bilirubin, total 0.3 10/06/2020 06:13 PM    Alk.  phosphatase 75 10/06/2020 06:13 PM    Protein, total 8.1 10/06/2020 06:13 PM    Albumin 4.2 10/06/2020 06:13 PM    Globulin 3.9 10/06/2020 06:13 PM    A-G Ratio 1.1 10/06/2020 06:13 PM    ALT (SGPT) 34 10/06/2020 06:13 PM      Vitals:    10/07/20 1704 10/07/20 2008 10/08/20 0803 10/08/20 0855   BP:  123/77 113/74 113/74   Pulse:  71 80 80   Resp:  16 16 16   Temp:  97.8 °F (36.6 °C) 98 °F (36.7 °C) 98 °F (36.7 °C)   SpO2:  97% 97% 97%   Weight: 77.1 kg (170 lb)      Height: 5' 8\" (1.727 m)            Current Facility-Administered Medications   Medication Dose Route Frequency Provider Last Rate Last Dose    lithium carbonate capsule 600 mg  600 mg Oral QHS Blaire Garcia MD   600 mg at 10/07/20 2114    divalproex ER (DEPAKOTE ER) 24 hour tablet 1,500 mg  1,500 mg Oral QHS Blaire Garcia MD   1,500 mg at 10/07/20 2114    QUEtiapine (SEROquel) tablet 100 mg  100 mg Oral QHS Blaire Garcia MD   100 mg at 10/07/20 2114    OLANZapine (ZyPREXA) tablet 5 mg  5 mg Oral Q6H PRN Dionna Salomon Mc, NP        haloperidol lactate (HALDOL) injection 5 mg  5 mg IntraMUSCular Q6H PRN Dionna Salomon Mc, NP        benztropine (COGENTIN) tablet 1 mg  1 mg Oral BID PRN Dionna Salomon Mc, NP        diphenhydrAMINE (BENADRYL) injection 50 mg  50 mg IntraMUSCular BID PRN Dionna Salomon Mc, NP        hydrOXYzine HCL (ATARAX) tablet 50 mg  50 mg Oral TID PRN Bety Salomon NP   50 mg at 10/07/20 1800    LORazepam (ATIVAN) injection 1 mg  1 mg IntraMUSCular Q4H PRN Dionna Salomon Mc, NP        traZODone (DESYREL) tablet 50 mg  50 mg Oral QHS PRN Bety Salomon NP   50 mg at 10/07/20 2203    acetaminophen (TYLENOL) tablet 650 mg  650 mg Oral Q4H PRN Dionna Salomon Mc, NP        magnesium hydroxide (MILK OF MAGNESIA) 400 mg/5 mL oral suspension 30 mL  30 mL Oral DAILY PRN Dionna Salomon Mc NP        nicotine (NICODERM CQ) 14 mg/24 hr patch 1 Patch  1 Patch TransDERmal DAILY Bety Salomon NP   1 Patch at 10/08/20 2190         Mental Status Exam:  Eye contact: fair  Grooming: fair  Psychomotor activity: WNL  Speech is spontaneous  Mood is \"okay\"  Affect: reactive  Perception: Denies any AH or VH. Suicidal ideation: Denies any SI or plan.    Cognition is grossly intact       Physical Exam:  Body habitus: Body mass index is 25.85 kg/m². Musculoskeletal system: normal gait  Tremor - neg  Cog wheeling - neg      Assessment and Plan:  Nino Spine meets criteria for a diagnosis of Bipolar 1 disorder, most recent episode arpit without psychotic symptoms, antidepressant induced. Check Depakote and Lithium tomorrow. Increased Seroquel 200mg at bedtime. Continue the medication regimen as prescribed  Disposition planning to continue. I certify that this patients inpatient psychiatric hospital services furnished since the previous certification were, and continue to be, required for treatment that could reasonably be expected to improve the patient's condition, or for diagnostic study, and that the patient continues to need, on a daily basis, active treatment furnished directly by or requiring the supervision of inpatient psychiatric facility personnel. In addition, the hospital records show that services furnished were intensive treatment services, admission or related services, or equivalent services.

## 2020-10-08 NOTE — PROGRESS NOTES
Problem: Altered Thought Process (Adult/Pediatric)  Goal: *STG: Remains safe in hospital  Outcome: Progressing Towards Goal  Goal: *STG: Attends activities and groups  Outcome: Progressing Towards Goal

## 2020-10-08 NOTE — PROGRESS NOTES
Laboratory Monitoring for Antipsychotics: This patient is currently prescribed the following medication(s):   Current Facility-Administered Medications   Medication Dose Route Frequency    QUEtiapine (SEROquel) tablet 200 mg  200 mg Oral QHS    lithium carbonate capsule 600 mg  600 mg Oral QHS    divalproex ER (DEPAKOTE ER) 24 hour tablet 1,500 mg  1,500 mg Oral QHS    nicotine (NICODERM CQ) 14 mg/24 hr patch 1 Patch  1 Patch TransDERmal DAILY     The following labs have been completed for monitoring of antipsychotics and/or mood stabilizers:    Height, Weight, BMI Estimation  Estimated body mass index is 25.85 kg/m² as calculated from the following:    Height as of this encounter: 172.7 cm (68\"). Weight as of this encounter: 77.1 kg (170 lb). Vital Signs/Blood Pressure  Visit Vitals  /75 (BP 1 Location: Left arm, BP Patient Position: Sitting)   Pulse 75   Temp 97.8 °F (36.6 °C)   Resp 16   Ht 172.7 cm (68\")   Wt 77.1 kg (170 lb)   SpO2 97%   BMI 25.85 kg/m²     Renal Function, Hepatic Function and Chemistry  Estimated Creatinine Clearance: 98.7 mL/min (based on SCr of 1.04 mg/dL). Lab Results   Component Value Date/Time    Sodium 138 10/06/2020 06:13 PM    Potassium 3.9 10/06/2020 06:13 PM    Chloride 106 10/06/2020 06:13 PM    CO2 25 10/06/2020 06:13 PM    Anion gap 7 10/06/2020 06:13 PM    BUN 16 10/06/2020 06:13 PM    Creatinine 1.04 10/06/2020 06:13 PM    BUN/Creatinine ratio 15 10/06/2020 06:13 PM    Bilirubin, total 0.3 10/06/2020 06:13 PM    Protein, total 8.1 10/06/2020 06:13 PM    Albumin 4.2 10/06/2020 06:13 PM    Globulin 3.9 10/06/2020 06:13 PM    A-G Ratio 1.1 10/06/2020 06:13 PM    ALT (SGPT) 34 10/06/2020 06:13 PM    AST (SGOT) 26 10/06/2020 06:13 PM    Alk.  phosphatase 75 10/06/2020 06:13 PM     Lab Results   Component Value Date/Time    Glucose 103 (H) 10/07/2020 05:41 AM    Glucose (POC) 98 07/28/2013 11:57 AM     No results found for: HBA1C, HGBE8, WZQ2EBUA    Hematology  Lab Results   Component Value Date/Time    WBC 7.4 10/06/2020 06:13 PM    RBC 4.57 10/06/2020 06:13 PM    HGB 14.3 10/06/2020 06:13 PM    HCT 43.6 10/06/2020 06:13 PM    MCV 95.4 10/06/2020 06:13 PM    MCH 31.3 10/06/2020 06:13 PM    MCHC 32.8 10/06/2020 06:13 PM    RDW 11.8 10/06/2020 06:13 PM    PLATELET 239 14/80/4502 06:13 PM     Lipids  Lab Results   Component Value Date/Time    Cholesterol, total 170 10/07/2020 05:41 AM    HDL Cholesterol 42 10/07/2020 05:41 AM    LDL, calculated 105.4 (H) 10/07/2020 05:41 AM    Triglyceride 113 10/07/2020 05:41 AM    CHOL/HDL Ratio 4.0 10/07/2020 05:41 AM     Thyroid Function  Lab Results   Component Value Date/Time    TSH 0.92 12/26/2012 07:15 AM     Assessment/Plan:  Recommended baseline laboratory monitoring has been completed based on this patient's current medication regimen. No further interventions are needed at this time. Follow-up metabolic monitoring labs should be completed in 3 months (quarterly for the first year of antipsychotic therapy).      Maine Tucker, OSMIND

## 2020-10-08 NOTE — DISCHARGE INSTRUCTIONS
DISCHARGE SUMMARY    Julius Sr  : 1990  MRN: [id number]    The patient Sree Benavides exhibits the ability to control behavior in a less restrictive environment. Patient's level of functioning is improving. No assaultive/destructive behavior has been observed for the past 24 hours. No suicidal/homicidal threat or behavior has been observed for the past 24 hours. There is no evidence of serious medication side effects. Patient has not been in physical or protective restraints for at least the past 24 hours. If weapons involved, how are they secured? No weapons involved     Is patient aware of and in agreement with discharge plan? He is aware of discharge and is in agreement     Arrangements for medication:  Prescriptions given to patient, sent to outpatient pharmacy     Copy of discharge instructions to provider?:  Yasmin RICHMOND    Arrangements for transportation home: Parents to  patient    Keep all follow up appointments as scheduled, continue to take prescribed medications per physician instructions. Mental health crisis number:  848 or your local mental health crisis line number at 663-9607. YOUR MISSING CLOTHES: We apologize that your clothes such as your white polo shirt and plaid shorts went missing during your stay. Nursing staff have searched not only your room but all patient rooms, laundry room and our donation closet. We have not found them on [facility] 21 can call the patient advocate office at [facility] 70 is a document that supports someone when they are having thoughts of suicide. Warning Signs that indicate a suicidal crisis may be developing: What (situations, thoughts, feelings, body sensations, behaviors, etc.) do you experience that lets you know you are beginning to think about suicide? 1. Racing thoughts   2. Moods that rapidly change  3.  Suicidal thinking    Internal Coping Strategies:  What things can I do (relaxation techniques, hobbies, physical activities, etc.) to take my mind off my problems without contacting another person? 1. Talking to my support system  2. Going outside for a walk   3. Sitting in quiet place    People and social settings that provide distraction: Who can I call or where can I go to distract me? 1. Name: mom and dad   Phone: their numbers are programmed into your phone\  2. Talk to your roommates    People whom I can ask for help: Who can I call when I need help - for example, friends, family, clergy, someone else? 1. Name: mom and dad   Phone: their number are programmed into your phone  2. Talk to your roommates    Professionals or [address] [place] [address] Blvd I can contact during a crisis: Who can I call for help - for example, my doctor, my psychiatrist, my psychologist, a mental health provider, a suicide hotline? 1. Clinician Name:    Jaiden Covington                                    Phone: [phone]       3. Suicide Prevention Lifeline: 6-900-922-TALK (2534)    4. [address] [place] [address] Emergency Services -  for example, [address] [place] [address] suicide hotline, [address] [place] [address]: [facility]      Emergency Services Address: [address] [place] [address] [address] [facility]       Emergency Services XQXRV:[phone]     Making the environment safe: How can I make my environment (house/apartment/living space) safer? For example, can I remove guns, medications, and other items? 1. Program you local crisis number at [phone]  into your phone. 2. Have your support system program your local crisis number into their phone. DISCHARGE SUMMARY from Nurse    PATIENT INSTRUCTIONS:    What to do at Home:  Recommended activity: Activity as tolerated,     *  Please give a list of your current medications to your Primary Care Provider.     *  Please update this list whenever your medications are discontinued, doses are      changed, or new medications (including over-the-counter products) are added. *  Please carry medication information at all times in case of emergency situations. These are general instructions for a healthy lifestyle:    No smoking/ No tobacco products/ Avoid exposure to second hand smoke  Surgeon General's Warning:  Quitting smoking now greatly reduces serious risk to your health. Obesity, smoking, and sedentary lifestyle greatly increases your risk for illness    A healthy diet, regular physical exercise & weight monitoring are important for maintaining a healthy lifestyle    You may be retaining fluid if you have a history of heart failure or if you experience any of the following symptoms:  Weight gain of 3 pounds or more overnight or 5 pounds in a week, increased swelling in our hands or feet or shortness of breath while lying flat in bed. Please call your doctor as soon as you notice any of these symptoms; do not wait until your next office visit. The discharge information has been reviewed with the patient. The patient verbalized understanding. Discharge medications reviewed with the patient and appropriate educational materials and side effects teaching were provided.   ___________________________________________________________________________________________________________________________________

## 2020-10-08 NOTE — BH NOTES
PRN Medication Documentation    Specific patient behavior that led to need for PRN medication: c/o anxiety  Staff interventions attempted prior to PRN being given: discussion  PRN medication given: Atarax  Patient response/effectiveness of PRN medication: will continue to monitor

## 2020-10-08 NOTE — BH NOTES
GROUP THERAPY PROGRESS NOTE    Tash Aguilera is participating in [employer] / Wellness Group    Group time: 30 minutes    Personal goal for participation: Prep for Today and Setting Goal(s)     Goal orientation: Eval Your Wellness and Coping In [employer]     Group therapy participation: active    Therapeutic interventions reviewed and discussed:     Impression of participation: Pt was in an upbeat mood ( #5 ) and voiced feeling  anxious . Pt's goals were to attend groups, take a shower and change into his clothes. He plans to advise [address] [place] [address] team that he is still having issues with his sleep and seeking medications. Pt overall participation was well received by his peers.

## 2020-10-08 NOTE — PROGRESS NOTES
Problem: Falls - Risk of  Goal: *Absence of Falls  Description: Document Jericho Benton Fall Risk and appropriate interventions in the flowsheet. Outcome: Progressing Towards Goal  Note: Fall Risk Interventions:            Medication Interventions: Teach patient to arise slowly, Evaluate medications/consider consulting pharmacy          Pt. Received resting in bed, NAD, respirations even and unlabored. Will continue q15 safety rounds.

## 2020-10-08 NOTE — INTERDISCIPLINARY ROUNDS
Behavioral Health Interdisciplinary Rounds Patient Name: Rai Veliz  Age: 26 y.o. Room/Bed:  736/ Primary Diagnosis: <principal problem not specified> Admission Status: Voluntary Readmission within 30 days: no 
Power of  in place: no 
Patient requires a blocked bed: no          Reason for blocked bed: VTE Prophylaxis: No 
 
Mobility needs/Fall risk: no 
Flu Vaccine : no  
Nutritional Plan: no 
Consults:         
Labs/Testing due today?: no 
 
Sleep hours:  8 Participation in Care/Groups:  yes Medication Compliant?: Yes PRNS (last 24 hours): Sleep Aid Restraints (last 24 hours):  no 
  
CIWA (range last 24 hours): COWS (range last 24 hours): Alcohol screening (AUDIT) completed -   AUDIT Score: 3 If applicable, date SBIRT discussed in treatment team AND documented:  
AUDIT Screen Score: AUDIT Score: 3 Tobacco - patient is a smoker: Have You Used Tobacco in the Past 30 Days: Yes Illegal Drugs use: Have You Used Any Illegal Substances Over the Past 12 Months: Yes 
 
24 hour chart check complete: yes Patient goal(s) for today:  
Treatment team focus/goals: Plan to titrate his medications. Progress note : He is feeling a little slowed down, LOS:  1  Expected LOS: TBD Financial concerns/prescription coverage:  No coverage Family contact:  He has contacted his family. Family requesting physician contact today:   
Discharge plan: he will return to his home with his roommates Access to weapons :  no Outpatient provider(s): José Miguel Miller NP Patient's preferred phone number for follow up call : [phone] [facility]) Participating treatment team members: Carlos Varela, ANTONINA - Serene Qiu.

## 2020-10-08 NOTE — PROGRESS NOTES
Patient participated in [address] [place] [address] on 10/08/2020. Rev.  Trine Lesch, MDiv, [facility], [facility] paging service: [phone])

## 2020-10-08 NOTE — PROGRESS NOTES
Problem: Falls - Risk of  Goal: *Absence of Falls  Description: Document Ivin Cole Fall Risk and appropriate interventions in the flowsheet.   Outcome: Progressing Towards Goal  Note: Fall Risk Interventions:            Medication Interventions: Teach patient to arise slowly, Evaluate medications/consider consulting pharmacy

## 2020-10-09 LAB
DATE LAST DOSE: NORMAL
LITHIUM SERPL-SCNC: 0.67 MMOL/L (ref 0.6–1.2)
REPORTED DOSE,DOSE: NORMAL UNITS
REPORTED DOSE/TIME,TMG: NORMAL

## 2020-10-09 PROCEDURE — 80178 ASSAY OF LITHIUM: CPT

## 2020-10-09 PROCEDURE — 65220000003 HC RM SEMIPRIVATE PSYCH

## 2020-10-09 PROCEDURE — 36415 COLL VENOUS BLD VENIPUNCTURE: CPT

## 2020-10-09 PROCEDURE — 74011250637 HC RX REV CODE- 250/637: Performed by: NURSE PRACTITIONER

## 2020-10-09 PROCEDURE — 74011250637 HC RX REV CODE- 250/637: Performed by: PSYCHIATRY & NEUROLOGY

## 2020-10-09 RX ADMIN — QUETIAPINE FUMARATE 200 MG: 100 TABLET ORAL at 21:41

## 2020-10-09 RX ADMIN — DIVALPROEX SODIUM 1500 MG: 500 TABLET, EXTENDED RELEASE ORAL at 21:41

## 2020-10-09 RX ADMIN — LITHIUM CARBONATE 750 MG: 300 CAPSULE ORAL at 21:41

## 2020-10-09 RX ADMIN — HYDROXYZINE HYDROCHLORIDE 50 MG: 50 TABLET, FILM COATED ORAL at 18:13

## 2020-10-09 NOTE — PROGRESS NOTES
Problem: Altered Thought Process (Adult/Pediatric)  Goal: *STG: Participates in treatment plan  Outcome: Progressing Towards Goal  Note: Out on unit passively engaged. Watching tv and social w peers. Denies SI, does not appear to be internally stimulated, mood stable and affect full range. Demonstrates appropriate behaviors.  Staff focus is on offering suport  Goal: *STG: Seeks staff when feelings of anxiety and fear arise  Outcome: Progressing Towards Goal  Goal: *STG: Attends activities and groups  Outcome: Progressing Towards Goal  Goal: *STG: Decreased delusional thinking  Outcome: Progressing Towards Goal  Goal: *STG: Decreased hallucinations  Outcome: Progressing Towards Goal  Goal: *STG: Demonstrates ability to understand and use improved judgment in daily activities and relationships  Outcome: Progressing Towards Goal  Goal: Interventions  Outcome: Progressing Towards Goal

## 2020-10-09 NOTE — BH NOTES
Chief Complaint:  I am getting better. Length of Stay: 3 Days    Interval History:  Racquel Whittaker is slowly improving. He slept better with the higher dose of Seroquel and his mind feels \"calmer\". Lithium level was 0.67 and he has not had any adverse events from it. Pleasant and calm and was able to board successfully on the General side of the unit. Denies any SI or plan. Depakote level is pending for today. No Ah or VH are present. Frequency of SI has decreased markedly and no longer feels agitated. No adverse events are noted from Seroquel. Past Medical History:  Past Medical History:   Diagnosis Date    ADHD (attention deficit hyperactivity disorder)     Anxiety     [facility] ([facility]     [facility]     Depression     Mood disorder (HCC)     Substance abuse ([facility]     Suicidal thoughts            Labs:  Lab Results   Component Value Date/Time    WBC 7.4 10/06/2020 06:13 PM    Hemoglobin (POC) 13.9 07/28/2013 11:57 AM    HGB 14.3 10/06/2020 06:13 PM    Hematocrit (POC) 41 07/28/2013 11:57 AM    HCT 43.6 10/06/2020 06:13 PM    PLATELET 717 40/33/8135 06:13 PM    MCV 95.4 10/06/2020 06:13 PM      Lab Results   Component Value Date/Time    Sodium 138 10/06/2020 06:13 PM    Potassium 3.9 10/06/2020 06:13 PM    Chloride 106 10/06/2020 06:13 PM    CO2 25 10/06/2020 06:13 PM    Anion gap 7 10/06/2020 06:13 PM    Glucose 103 (H) 10/07/2020 05:41 AM    BUN 16 10/06/2020 06:13 PM    Creatinine 1.04 10/06/2020 06:13 PM    BUN/Creatinine ratio 15 10/06/2020 06:13 PM    GFR est AA >60 10/06/2020 06:13 PM    GFR est non-AA >60 10/06/2020 06:13 PM    Calcium 10.0 10/06/2020 06:13 PM    Bilirubin, total 0.3 10/06/2020 06:13 PM    Alk.  phosphatase 75 10/06/2020 06:13 PM    Protein, total 8.1 10/06/2020 06:13 PM    Albumin 4.2 10/06/2020 06:13 PM    Globulin 3.9 10/06/2020 06:13 PM    A-G Ratio 1.1 10/06/2020 06:13 PM    ALT (SGPT) 34 10/06/2020 06:13 PM      Vitals:    10/08/20 8637 10/08/20 1533 10/09/20 0759 10/09/20 1150   BP: 113/74 124/75 115/75 119/72   Pulse: 80 75 76 71   Resp: 16 16 16 16   Temp: 98 °F (36.7 °C) 97.8 °F (36.6 °C) 98 °F (36.7 °C) 97.9 °F (36.6 °C)   SpO2: 97% 97% 97% 97%   Weight:       Height:             Current Facility-Administered Medications   Medication Dose Route Frequency Provider Last Rate Last Dose    lithium carbonate capsule 750 mg  750 mg Oral QHS Nicklas Hodgkin, MD        QUEtiapine (SEROquel) tablet 200 mg  200 mg Oral QHS Wylie Opitz M, MD   200 mg at 10/08/20 2110    divalproex ER (DEPAKOTE ER) 24 hour tablet 1,500 mg  1,500 mg Oral QHS Nicklas Hodgkin, MD   1,500 mg at 10/08/20 2109    OLANZapine (ZyPREXA) tablet 5 mg  5 mg Oral Q6H PRN Natividad Salomon NP        haloperidol lactate (HALDOL) injection 5 mg  5 mg IntraMUSCular Q6H PRN Natividad Salomon NP        benztropine (COGENTIN) tablet 1 mg  1 mg Oral BID PRN Natividad Salomon NP        diphenhydrAMINE (BENADRYL) injection 50 mg  50 mg IntraMUSCular BID PRN Natividad Salomon NP        hydrOXYzine HCL (ATARAX) tablet 50 mg  50 mg Oral TID PRN Bety Salomon NP   50 mg at 10/08/20 1422    LORazepam (ATIVAN) injection 1 mg  1 mg IntraMUSCular Q4H PRN Natividad Salomon NP        traZODone (DESYREL) tablet 50 mg  50 mg Oral QHS PRN Bety Salomon NP   50 mg at 10/08/20 2110    acetaminophen (TYLENOL) tablet 650 mg  650 mg Oral Q4H PRN Natividad Salomon NP        magnesium hydroxide (MILK OF MAGNESIA) 400 mg/5 mL oral suspension 30 mL  30 mL Oral DAILY PRN Natividad Salomon, NP        nicotine (NICODERM CQ) 14 mg/24 hr patch 1 Patch  1 Patch TransDERmal DAILY Bety Salomon NP   1 Patch at 10/09/20 0911         Mental Status Exam:  Eye contact: fair  Grooming: fair  Psychomotor activity: WNL  Speech is spontaneous  Mood is \"okay\"  Affect: reactive  Perception: Denies any AH or VH. Suicidal ideation: Denies any SI or plan. Cognition is grossly intact       Physical Exam:  Body habitus: Body mass index is 25.85 kg/m². Musculoskeletal system: normal gait  Tremor - neg  Cog wheeling - neg      Assessment and Plan:  Jenna Darden meets criteria for a diagnosis of Bipolar 1 disorder, most recent episode arpit without psychotic symptoms, antidepressant induced. Check Depakote level today. Continue the medication regimen as prescribed  Disposition planning to continue. I certify that this patients inpatient psychiatric hospital services furnished since the previous certification were, and continue to be, required for treatment that could reasonably be expected to improve the patient's condition, or for diagnostic study, and that the patient continues to need, on a daily basis, active treatment furnished directly by or requiring the supervision of inpatient psychiatric facility personnel. In addition, the hospital records show that services furnished were intensive treatment services, admission or related services, or equivalent services.

## 2020-10-09 NOTE — PROGRESS NOTES
Problem: Altered Thought Process (Adult/Pediatric)  Goal: *STG: Participates in treatment plan  Outcome: Progressing Towards Goal  Goal: *STG: Complies with medication therapy  Outcome: Progressing Towards Goal  Patient compliant with all medications. Goal: *STG: Attends activities and groups  Outcome: Progressing Towards Goal     Problem: Patient Education: Go to Patient Education Activity  Goal: Patient/Family Education  Outcome: Progressing Towards Goal     [phone]  Patient boarding on general currently. Waiting for bed to open up before transferring patient.

## 2020-10-09 NOTE — INTERDISCIPLINARY ROUNDS
Behavioral Health Interdisciplinary Rounds Patient Name: Sidney Tomlinson  Age: 26 y.o. Room/Bed:  736/ Primary Diagnosis: <principal problem not specified> Admission Status: Voluntary Readmission within 30 days: no 
Power of  in place: no 
Patient requires a blocked bed: no          Reason for blocked bed: VTE Prophylaxis: No 
 
Mobility needs/Fall risk: no 
Flu Vaccine : no  
Nutritional Plan: no 
Consults:         
Labs/Testing due today?: yes Sleep hours: 8 Participation in Care/Groups:  yes Medication Compliant?: Yes PRNS (last 24 hours): Sleep Aid Restraints (last 24 hours):  no 
  
CIWA (range last 24 hours): COWS (range last 24 hours): Alcohol screening (AUDIT) completed -   AUDIT Score: 3 If applicable, date SBIRT discussed in treatment team AND documented:  
AUDIT Screen Score: AUDIT Score: 3 Tobacco - patient is a smoker: Have You Used Tobacco in the Past 30 Days: Yes Illegal Drugs use: Have You Used Any Illegal Substances Over the Past 12 Months: Yes 
 
24 hour chart check complete: yes Patient goal(s) for today:  
Treatment team focus/goals: plan to transfer to the general unit Progress note : he has been pleasant and compliant with his treatment LOS:  3  Expected LOS: TBD Financial concerns/prescription coverage:  No benefits ( Please fill scripts at the [facility] pharmacy Family contact:      
Family requesting physician contact today:   
Discharge plan: he will return home with his roommates Access to weapons :  No       
Outpatient provider(s): Michelle Nieto Patient's preferred phone number for follow up call :  
 
Participating treatment team members: Savanna Torres

## 2020-10-09 NOTE — BH NOTES
PRN Medication Documentation    Specific patient behavior that led to need for PRN medication: Pt observed wringing hands, mildly pacing. Rates anxiety has a 5/10 R/T misplacement of personal clothing   Staff interventions attempted prior to PRN being given: 1:1 with patient to identify trigger for anxiety, practice deep breathing, encouraged to journal.  PRN medication given: atarax 50mg PO given  Patient response/effectiveness of PRN medication: will continue to monitor      1847 Pt verbalizes he is able to focus and process through issue. Reports anxiety is 2/10 at present.

## 2020-10-09 NOTE — PROGRESS NOTES
Problem: Falls - Risk of  Goal: *Absence of Falls  Description: Document Jericho Benton Fall Risk and appropriate interventions in the flowsheet. Outcome: Progressing Towards Goal  Note: Fall Risk Interventions:            Medication Interventions: Teach patient to arise slowly       Pt. Received resting in bed, NAD, respirations even and unlabored. Will continue q15 safety rounds.

## 2020-10-09 NOTE — PROGRESS NOTES
Laboratory Monitoring for Lithium: This patient is currently prescribed the following medication(s):   Current Facility-Administered Medications   Medication Dose Route Frequency    QUEtiapine (SEROquel) tablet 200 mg  200 mg Oral QHS    lithium carbonate capsule 600 mg  600 mg Oral QHS    divalproex ER (DEPAKOTE ER) 24 hour tablet 1,500 mg  1,500 mg Oral QHS    nicotine (NICODERM CQ) 14 mg/24 hr patch 1 Patch  1 Patch TransDERmal DAILY     The following labs have been completed for monitoring of lithium:    Lithium Serum Concentration  Lab Results   Component Value Date/Time    Lithium level 0.67 10/09/2020 05:24 AM     Renal Function and Chemistry  Lab Results   Component Value Date/Time    Sodium 138 10/06/2020 06:13 PM    Potassium 3.9 10/06/2020 06:13 PM    Chloride 106 10/06/2020 06:13 PM    CO2 25 10/06/2020 06:13 PM    Anion gap 7 10/06/2020 06:13 PM    BUN 16 10/06/2020 06:13 PM    Creatinine 1.04 10/06/2020 06:13 PM    BUN/Creatinine ratio 15 10/06/2020 06:13 PM     Assessment/Plan:  A lithium level was drawn on 10/9 @ 0524 and found to be 0.67 mmol/L. This level was drawn ~9 hr following the previous dose and is reflective of steady state level (continuation of home regimen). Therapeutic lithium level is generally 0.8-1.2mmol/L (however, some patients may have a target level lower than 0.8mmol/L). This level is considered subtherapeutic. Recommend increasing the dose to 900mg QHS and rechecking level on Monday or Tuesday.      Rafita Vásquez, PharmD, BCPP, [facility] Specialist, [facility]

## 2020-10-10 LAB — VALPROATE SERPL-MCNC: 91 UG/ML (ref 50–100)

## 2020-10-10 PROCEDURE — 74011250637 HC RX REV CODE- 250/637: Performed by: NURSE PRACTITIONER

## 2020-10-10 PROCEDURE — 80164 ASSAY DIPROPYLACETIC ACD TOT: CPT

## 2020-10-10 PROCEDURE — 74011250637 HC RX REV CODE- 250/637: Performed by: PSYCHIATRY & NEUROLOGY

## 2020-10-10 PROCEDURE — 65220000003 HC RM SEMIPRIVATE PSYCH

## 2020-10-10 PROCEDURE — 36415 COLL VENOUS BLD VENIPUNCTURE: CPT

## 2020-10-10 RX ADMIN — QUETIAPINE FUMARATE 200 MG: 100 TABLET ORAL at 21:03

## 2020-10-10 RX ADMIN — HYDROXYZINE HYDROCHLORIDE 50 MG: 50 TABLET, FILM COATED ORAL at 10:36

## 2020-10-10 RX ADMIN — DIVALPROEX SODIUM 1500 MG: 500 TABLET, EXTENDED RELEASE ORAL at 21:03

## 2020-10-10 RX ADMIN — LITHIUM CARBONATE 750 MG: 300 CAPSULE ORAL at 21:03

## 2020-10-10 NOTE — BH NOTES
GROUP THERAPY PROGRESS NOTE    Patient did not participate in Discharge/Goals Group.      Leigh Gonzalez, Supervisee in Social Work

## 2020-10-10 NOTE — BH NOTES
Chief Complaint:  I am better. Length of Stay: 4 Days    Interval History:  Zeynep Aleman is slowly improving. He has been somewhat preoccupied with the loss of some of his belonging on the unit and was somewhat upset about this. Slept well last night and his thoughts are \"much more calmer\". Denies any AH or VH. No SI or plan. Calm and pleasant during the interview. Past Medical History:  Past Medical History:   Diagnosis Date    ADHD (attention deficit hyperactivity disorder)     Anxiety     [facility] disorder ([facility]     Borderline personality disorder ([facility]     Depression     Mood disorder (HCC)     Substance abuse ([facility]     Suicidal thoughts            Labs:  Lab Results   Component Value Date/Time    WBC 7.4 10/06/2020 06:13 PM    Hemoglobin (POC) 13.9 07/28/2013 11:57 AM    HGB 14.3 10/06/2020 06:13 PM    Hematocrit (POC) 41 07/28/2013 11:57 AM    HCT 43.6 10/06/2020 06:13 PM    PLATELET 770 48/32/0261 06:13 PM    MCV 95.4 10/06/2020 06:13 PM      Lab Results   Component Value Date/Time    Sodium 138 10/06/2020 06:13 PM    Potassium 3.9 10/06/2020 06:13 PM    Chloride 106 10/06/2020 06:13 PM    CO2 25 10/06/2020 06:13 PM    Anion gap 7 10/06/2020 06:13 PM    Glucose 103 (H) 10/07/2020 05:41 AM    BUN 16 10/06/2020 06:13 PM    Creatinine 1.04 10/06/2020 06:13 PM    BUN/Creatinine ratio 15 10/06/2020 06:13 PM    GFR est AA >60 10/06/2020 06:13 PM    GFR est non-AA >60 10/06/2020 06:13 PM    Calcium 10.0 10/06/2020 06:13 PM    Bilirubin, total 0.3 10/06/2020 06:13 PM    Alk.  phosphatase 75 10/06/2020 06:13 PM    Protein, total 8.1 10/06/2020 06:13 PM    Albumin 4.2 10/06/2020 06:13 PM    Globulin 3.9 10/06/2020 06:13 PM    A-G Ratio 1.1 10/06/2020 06:13 PM    ALT (SGPT) 34 10/06/2020 06:13 PM      Vitals:    10/09/20 1150 10/09/20 1538 10/09/20 1950 10/10/20 0941   BP: 119/72 122/79 (!) 149/79 104/80   Pulse: 71 72 99 82   Resp: 16 16 16 16   Temp: 97.9 °F (36.6 °C) 98.2 °F (36.8 °C) 97.6 °F (36.4 °C) 97.6 °F (36.4 °C)   SpO2: 97% 98%  96%   Weight:       Height:             Current Facility-Administered Medications   Medication Dose Route Frequency Provider Last Rate Last Dose    lithium carbonate capsule 750 mg  750 mg Oral QHS Dirk Asher MD   750 mg at 10/09/20 2141    QUEtiapine (SEROquel) tablet 200 mg  200 mg Oral QHS Dirk Asher MD   200 mg at 10/09/20 2141    divalproex ER (DEPAKOTE ER) 24 hour tablet 1,500 mg  1,500 mg Oral QHS Dirk Asher MD   1,500 mg at 10/09/20 2141    OLANZapine (ZyPREXA) tablet 5 mg  5 mg Oral Q6H PRN Marlon Salomon NP        haloperidol lactate (HALDOL) injection 5 mg  5 mg IntraMUSCular Q6H PRN Marlon Salomon NP        benztropine (COGENTIN) tablet 1 mg  1 mg Oral BID PRN Marlon Salomon NP        diphenhydrAMINE (BENADRYL) injection 50 mg  50 mg IntraMUSCular BID PRN Marlon Salomon NP        hydrOXYzine HCL (ATARAX) tablet 50 mg  50 mg Oral TID PRN Bety Salomon NP   50 mg at 10/10/20 1036    LORazepam (ATIVAN) injection 1 mg  1 mg IntraMUSCular Q4H PRN Marlon Salomon NP        traZODone (DESYREL) tablet 50 mg  50 mg Oral QHS PRN Bety Salomon NP   50 mg at 10/08/20 2110    acetaminophen (TYLENOL) tablet 650 mg  650 mg Oral Q4H PRN Marlon Salomon NP        magnesium hydroxide (MILK OF MAGNESIA) 400 mg/5 mL oral suspension 30 mL  30 mL Oral DAILY PRN Marlon Salomon NP        nicotine (NICODERM CQ) 14 mg/24 hr patch 1 Patch  1 Patch TransDERmal DAILY Bety Salomon NP   1 Patch at 10/10/20 1034         Mental Status Exam:  Eye contact: fair  Grooming: fair  Psychomotor activity: WNL  Speech is spontaneous  Mood is \"okay\"  Affect: reactive  Perception: Denies any AH or VH. Suicidal ideation: Denies any SI or plan.    Cognition is grossly intact       Physical Exam:  Body habitus: Body mass index is 25.85 kg/m². Musculoskeletal system: normal gait  Tremor - neg  Cog wheeling - neg      Assessment and Plan:  Arsalan Kramer meets criteria for a diagnosis of Bipolar 1 disorder, most recent episode arpit without psychotic symptoms, antidepressant induced. Check Depakote level today. Continue the medication regimen as prescribed  Disposition planning to continue. I certify that this patients inpatient psychiatric hospital services furnished since the previous certification were, and continue to be, required for treatment that could reasonably be expected to improve the patient's condition, or for diagnostic study, and that the patient continues to need, on a daily basis, active treatment furnished directly by or requiring the supervision of inpatient psychiatric facility personnel. In addition, the hospital records show that services furnished were intensive treatment services, admission or related services, or equivalent services.

## 2020-10-10 NOTE — BH NOTES
PRN Medication Documentation  @ 0701  Specific patient behavior that led to need for PRN medication: increasing anxiety  Staff interventions attempted prior to PRN being given: rest, therapeutic listening  PRN medication given: Atarax 50 mg PO  Patient response/effectiveness of PRN medication: effective, patient conversing in dayroom with peers one hour following medication, states feels less anxious

## 2020-10-10 NOTE — PROGRESS NOTES
Problem: Altered Thought Process (Adult/Pediatric)  Goal: *STG: Participates in treatment plan  Outcome: Progressing Towards Goal  Goal: *STG: Seeks staff when feelings of anxiety and fear arise  Outcome: Progressing Towards Goal  Goal: *STG: Attends activities and groups  Outcome: Progressing Towards Goal  Goal: *STG: Decreased delusional thinking  Outcome: Progressing Towards Goal  Goal: *STG: Decreased hallucinations  Outcome: Progressing Towards Goal     Problem: Altered Thought Process (Adult/Pediatric)  Goal: *STG: Demonstrates ability to understand and use improved judgment in daily activities and relationships  Outcome: Not Progressing Towards Goal

## 2020-10-10 NOTE — PROGRESS NOTES
Problem: Altered Thought Process (Adult/Pediatric)  Goal: *STG: Participates in treatment plan  Outcome: Progressing Towards Goal  Note: Pt out on the unit and interacting with peers and staff. Focused on missing clothing at this time. Staff has searched the unit and did not find any shorts or T-shirt. Offered pt clothing from the clothing closet. Pt requesting hospital to pay for clothing. Encouraged pt to participate on the unit. Goal: Interventions  Outcome: Progressing Towards Goal     Problem: Patient Education: Go to Patient Education Activity  Goal: Patient/Family Education  Outcome: Progressing Towards Goal     Problem: Falls - Risk of  Goal: *Absence of Falls  Description: Document Kang Bray Fall Risk and appropriate interventions in the flowsheet.   Outcome: Progressing Towards Goal  Note: Fall Risk Interventions:            Medication Interventions: Teach patient to arise slowly                   Problem: Patient Education: Go to Patient Education Activity  Goal: Patient/Family Education  Outcome: Progressing Towards Goal

## 2020-10-10 NOTE — PROGRESS NOTES
2300: Resting quietly in bed with eyes closed. No apparent distress. Respirations are even and unlabored. Staff will continue to monitor q15 throughout the shift. Problem: Falls - Risk of  Goal: *Absence of Falls  Description: Document Rick Moeller Fall Risk and appropriate interventions in the flowsheet.   Outcome: Progressing Towards Goal  Note: Fall Risk Interventions:            Medication Interventions: Teach patient to arise slowly

## 2020-10-11 PROCEDURE — 65220000003 HC RM SEMIPRIVATE PSYCH

## 2020-10-11 PROCEDURE — 74011250637 HC RX REV CODE- 250/637: Performed by: PSYCHIATRY & NEUROLOGY

## 2020-10-11 PROCEDURE — 74011250637 HC RX REV CODE- 250/637: Performed by: NURSE PRACTITIONER

## 2020-10-11 RX ADMIN — HYDROXYZINE HYDROCHLORIDE 50 MG: 50 TABLET, FILM COATED ORAL at 13:47

## 2020-10-11 RX ADMIN — DIVALPROEX SODIUM 1500 MG: 500 TABLET, EXTENDED RELEASE ORAL at 21:08

## 2020-10-11 RX ADMIN — QUETIAPINE FUMARATE 200 MG: 100 TABLET ORAL at 21:09

## 2020-10-11 RX ADMIN — LITHIUM CARBONATE 750 MG: 300 CAPSULE ORAL at 21:09

## 2020-10-11 NOTE — PROGRESS NOTES
11:46 am-message left for patient advocate to see pt on Monday regarding his clothing. Problem: Altered Thought Process (Adult/Pediatric)  Goal: *STG: Participates in treatment plan  Outcome: Progressing Towards Goal  Note: Pt out on the unit for small periods of time. Continues to focus on missing clothing. Did not attend the morning group. Encouraged pt to attend groups and participate on the unit.   Goal: Interventions  Outcome: Progressing Towards Goal     Problem: Patient Education: Go to Patient Education Activity  Goal: Patient/Family Education  Outcome: Progressing Towards Goal

## 2020-10-11 NOTE — PROGRESS NOTES
Pt received resting quietly in bed. No acute distress noted. Problem: Altered Thought Process (Adult/Pediatric)  Goal: *STG: Decreased delusional thinking  Outcome: Progressing Towards Goal     Problem: Falls - Risk of  Goal: *Absence of Falls  Description: Document Agapito Fall Risk and appropriate interventions in the flowsheet.   Outcome: Progressing Towards Goal  Note: Fall Risk Interventions:      Medication Interventions: Teach patient to arise slowly

## 2020-10-11 NOTE — BH NOTES
GROUP THERAPY PROGRESS NOTE    Patient is participating in Coping/Process group. Group time: 50 minutes    Personal goal for participation: To try new coping skills. Goal orientation: Personal    Group therapy participation: active    Therapeutic interventions reviewed and discussed: Group participating in a guided imagery activity designed for depression. Group members were asked to try to relax and follow along with the CD/video. Discussion of how each person felt during and after exercise. Patients were encouraged to share the safe space that they created and how this can be used as a coping skill in their lives. Groups were also handed a sheet with a list of unhelpful thinking styles, and this was processed before the activity as well. Impression of participation: Kala Costa actively participated in this activity. He said the space he created reminded him of [employer]. Calm and pleasant. Appears to be in a good mood.      Leigh Gonzalez, Supervisee in Social Work

## 2020-10-11 NOTE — INTERDISCIPLINARY ROUNDS
Behavioral Health Interdisciplinary Rounds Patient Name: Jeanmarie Mcgill  Age: 26 y.o. Room/Bed:  725/ Primary Diagnosis: <principal problem not specified> Admission Status: Voluntary Readmission within 30 days: no 
Power of  in place: no 
Patient requires a blocked bed: no          Reason for blocked bed: VTE Prophylaxis: No 
 
Mobility needs/Fall risk: no 
Flu Vaccine : no  
Nutritional Plan: no 
Consults:         
Labs/Testing due today?: no 
 
Sleep hours:  7 Participation in Care/Groups:  yes Medication Compliant?: Yes PRNS (last 24 hours): Antianxiety Restraints (last 24 hours):  no 
  
CIWA (range last 24 hours): COWS (range last 24 hours): Alcohol screening (AUDIT) completed -   AUDIT Score: 3 If applicable, date SBIRT discussed in treatment team AND documented:  
AUDIT Screen Score: AUDIT Score: 3 Document Brief Intervention (corresponds directly with the 5 A's, Ask, Advise, Assess, Assist, and Arrange): At- Risk Patients (Score 7-15 for women; 8-15 for men) Discuss concern patient is drinking at unhealthy levels known to increase risk of alcohol-related health problems. Is Patient ready to commit to change? If No: 
? Encourage reflection ? Discuss short term and long term health risks of consuming alcohol ? Barriers to change ? Reaffirm willingness to help / Educational materials provided If Yes: 
? Set goal 
? Plan 
? Educational materials provided Harmful use or Dependence (Score 16 or greater) ? Discuss short term and long term health risks of consuming alcohol ? Recommendations ? Negotiate drinking goal 
? Recommend addiction specialist/center ? Arrange follow-up appointments. Tobacco - patient is a smoker: Have You Used Tobacco in the Past 30 Days: Yes Illegal Drugs use: Have You Used Any Illegal Substances Over the Past 12 Months: Yes 
 
24 hour chart check complete: yes Patient goal(s) for today: Treatment team focus/goals:  
Progress note LOS:  5  Expected LOS:  
 
Financial concerns/prescription coverage:  no 
Family contact:      
Family requesting physician contact today:  no 
Discharge plan: Access to weapons : no Outpatient provider(s):  
Patient's preferred phone number for follow up call :  
 
Participating treatment team members: Gonzalo Montero, * (assigned SW),

## 2020-10-11 NOTE — BH NOTES
GROUP THERAPY PROGRESS NOTE    Patient did not participate in Process Group.      Leigh Gonzalez, Supervisee in Social Work

## 2020-10-11 NOTE — PROGRESS NOTES
Received pt up ad lor, interacting w/peers. No distress noted. Respirations WNL. Will continue to monitor q15 for safety. Problem: Falls - Risk of  Goal: *Absence of Falls  Description: Document Chintan Posey Fall Risk and appropriate interventions in the flowsheet.   Outcome: Progressing Towards Goal  Note: Fall Risk Interventions:            Medication Interventions: Patient to call before getting OOB, Teach patient to arise slowly

## 2020-10-11 NOTE — BH NOTES
Chief Complaint:  I am feeling tired. Length of Stay: 5 Days    Interval History:  Katherine King is managing improvement. His Depakote level was 91 yesterday and he has not had any adverse events from it. Calm and pleasant during the interview. Still feels tired today even though he slept well last night. Describes his moods as \"fine\". Denies any SI or plan. No Ah or Vh.     Past Medical History:  Past Medical History:   Diagnosis Date    ADHD (attention deficit hyperactivity disorder)     Anxiety     [facility]ipolar affective disorder ([facility]     Borderline personality disorder ([facility]     Depression     Mood disorder ([facility])     Substance abuse ([facility]     Suicidal thoughts            Labs:  Lab Results   Component Value Date/Time    WBC 7.4 10/06/2020 06:13 PM    Hemoglobin (POC) 13.9 07/28/2013 11:57 AM    HGB 14.3 10/06/2020 06:13 PM    Hematocrit (POC) 41 07/28/2013 11:57 AM    HCT 43.6 10/06/2020 06:13 PM    PLATELET 416 72/01/6505 06:13 PM    MCV 95.4 10/06/2020 06:13 PM      Lab Results   Component Value Date/Time    Sodium 138 10/06/2020 06:13 PM    Potassium 3.9 10/06/2020 06:13 PM    Chloride 106 10/06/2020 06:13 PM    CO2 25 10/06/2020 06:13 PM    Anion gap 7 10/06/2020 06:13 PM    Glucose 103 (H) 10/07/2020 05:41 AM    BUN 16 10/06/2020 06:13 PM    Creatinine 1.04 10/06/2020 06:13 PM    BUN/Creatinine ratio 15 10/06/2020 06:13 PM    GFR est AA >60 10/06/2020 06:13 PM    GFR est non-AA >60 10/06/2020 06:13 PM    Calcium 10.0 10/06/2020 06:13 PM    Bilirubin, total 0.3 10/06/2020 06:13 PM    Alk.  phosphatase 75 10/06/2020 06:13 PM    Protein, total 8.1 10/06/2020 06:13 PM    Albumin 4.2 10/06/2020 06:13 PM    Globulin 3.9 10/06/2020 06:13 PM    A-G Ratio 1.1 10/06/2020 06:13 PM    ALT (SGPT) 34 10/06/2020 06:13 PM      Vitals:    10/10/20 0941 10/10/20 1544 10/10/20 1946 10/11/20 0829   BP: 104/80 119/68 (!) 140/90 116/74   Pulse: 82 69 76 76   Resp: 16 16 16 16   Temp: 97.6 °F (36.4 °C) 97.8 °F (36.6 °C) 97.4 °F (36.3 °C) 97.7 °F (36.5 °C)   SpO2: 96% 97% 99% 95%   Weight:    75.3 kg (166 lb)   Height:             Current Facility-Administered Medications   Medication Dose Route Frequency Provider Last Rate Last Dose    lithium carbonate capsule 750 mg  750 mg Oral QHS Сергей Tenorio MD   750 mg at 10/10/20 2103    QUEtiapine (SEROquel) tablet 200 mg  200 mg Oral QHS Сергей Tenorio MD   200 mg at 10/10/20 2103    divalproex ER (DEPAKOTE ER) 24 hour tablet 1,500 mg  1,500 mg Oral QHS Сергей Tenorio MD   1,500 mg at 10/10/20 2103    OLANZapine (ZyPREXA) tablet 5 mg  5 mg Oral Q6H PRN Akiko Salomon NP        haloperidol lactate (HALDOL) injection 5 mg  5 mg IntraMUSCular Q6H PRN Akiko Salomon NP        benztropine (COGENTIN) tablet 1 mg  1 mg Oral BID PRN Akiko Salomon NP        diphenhydrAMINE (BENADRYL) injection 50 mg  50 mg IntraMUSCular BID PRN Akiko Salomon NP        hydrOXYzine HCL (ATARAX) tablet 50 mg  50 mg Oral TID PRN Bety Saolmon NP   50 mg at 10/10/20 1036    LORazepam (ATIVAN) injection 1 mg  1 mg IntraMUSCular Q4H PRN Akiko Salomon NP        traZODone (DESYREL) tablet 50 mg  50 mg Oral QHS PRN Bety Salomon NP   50 mg at 10/08/20 2110    acetaminophen (TYLENOL) tablet 650 mg  650 mg Oral Q4H PRN Akiko Salomon NP        magnesium hydroxide (MILK OF MAGNESIA) 400 mg/5 mL oral suspension 30 mL  30 mL Oral DAILY PRN Akiko Salomon NP        nicotine (NICODERM CQ) 14 mg/24 hr patch 1 Patch  1 Patch TransDERmal DAILY Bety Salomon NP   1 Patch at 10/11/20 0901         Mental Status Exam:  Eye contact: fair  Grooming: fair  Psychomotor activity: WNL  Speech is spontaneous  Mood is \"okay\"  Affect: reactive  Perception: Denies any AH or VH. Suicidal ideation: Denies any SI or plan.    Cognition is grossly intact       Physical Exam:  Body habitus: Body mass index is 25.24 kg/m². Musculoskeletal system: normal gait  Tremor - neg  Cog wheeling - neg      Assessment and Plan:  Susan Haley meets criteria for a diagnosis of Bipolar 1 disorder, most recent episode arpit without psychotic symptoms, antidepressant induced. Lithium level in AM.   Continue the medication regimen as prescribed  Disposition planning to continue. I certify that this patients inpatient psychiatric hospital services furnished since the previous certification were, and continue to be, required for treatment that could reasonably be expected to improve the patient's condition, or for diagnostic study, and that the patient continues to need, on a daily basis, active treatment furnished directly by or requiring the supervision of inpatient psychiatric facility personnel. In addition, the hospital records show that services furnished were intensive treatment services, admission or related services, or equivalent services.

## 2020-10-12 VITALS
TEMPERATURE: 97.9 F | HEIGHT: 68 IN | RESPIRATION RATE: 16 BRPM | DIASTOLIC BLOOD PRESSURE: 78 MMHG | OXYGEN SATURATION: 96 % | BODY MASS INDEX: 25.16 KG/M2 | HEART RATE: 66 BPM | WEIGHT: 166 LBS | SYSTOLIC BLOOD PRESSURE: 114 MMHG

## 2020-10-12 LAB
DATE LAST DOSE: NORMAL
LITHIUM SERPL-SCNC: 0.96 MMOL/L (ref 0.6–1.2)
REPORTED DOSE,DOSE: NORMAL UNITS
REPORTED DOSE/TIME,TMG: NORMAL

## 2020-10-12 PROCEDURE — 80178 ASSAY OF LITHIUM: CPT

## 2020-10-12 PROCEDURE — 74011250637 HC RX REV CODE- 250/637: Performed by: NURSE PRACTITIONER

## 2020-10-12 PROCEDURE — 36415 COLL VENOUS BLD VENIPUNCTURE: CPT

## 2020-10-12 RX ORDER — HYDROXYZINE 50 MG/1
50 TABLET, FILM COATED ORAL
Qty: 90 TAB | Refills: 0 | Status: SHIPPED | OUTPATIENT
Start: 2020-10-12 | End: 2020-10-22

## 2020-10-12 RX ORDER — LITHIUM CARBONATE 150 MG/1
750 CAPSULE ORAL
Qty: 90 CAP | Refills: 0 | Status: SHIPPED | OUTPATIENT
Start: 2020-10-12 | End: 2020-10-12 | Stop reason: SDUPTHER

## 2020-10-12 RX ORDER — QUETIAPINE FUMARATE 200 MG/1
200 TABLET, FILM COATED ORAL
Qty: 30 TAB | Refills: 0 | Status: SHIPPED | OUTPATIENT
Start: 2020-10-12 | End: 2021-04-12 | Stop reason: ALTCHOICE

## 2020-10-12 RX ORDER — LITHIUM CARBONATE 150 MG/1
750 CAPSULE ORAL
Qty: 150 CAP | Refills: 0 | Status: SHIPPED | OUTPATIENT
Start: 2020-10-12

## 2020-10-12 RX ORDER — DIVALPROEX SODIUM 500 MG/1
1500 TABLET, EXTENDED RELEASE ORAL
Qty: 90 TAB | Refills: 0 | Status: SHIPPED | OUTPATIENT
Start: 2020-10-12

## 2020-10-12 RX ORDER — TRAZODONE HYDROCHLORIDE 50 MG/1
50 TABLET ORAL
Qty: 30 TAB | Refills: 0 | Status: SHIPPED | OUTPATIENT
Start: 2020-10-12 | End: 2021-04-12 | Stop reason: ALTCHOICE

## 2020-10-12 RX ADMIN — HYDROXYZINE HYDROCHLORIDE 50 MG: 50 TABLET, FILM COATED ORAL at 08:58

## 2020-10-12 NOTE — PROGRESS NOTES
Pharmacist Discharge Medication Reconciliation    Discharging Provider: Dr. Dakota Jacobson PMH:   Past Medical History:   Diagnosis Date    ADHD (attention deficit hyperactivity disorder)     Anxiety     Bipolar affective disorder ([facility]     Borderline personality disorder ([facility]     Depression     Mood disorder ([facility]     Substance abuse ([facility]     Suicidal thoughts      Chief Complaint for this Admission:   Chief Complaint   Patient presents with    Mental Health Problem     Allergies: Patient has no known allergies. Discharge Medications:   Current Discharge Medication List        START taking these medications    Details   divalproex ER (DEPAKOTE ER) 500 mg ER tablet Take 3 Tabs by mouth nightly. Indications: arpit associated with bipolar disorder  Qty: 90 Tab, Refills: 0      QUEtiapine (SEROquel) 200 mg tablet Take 1 Tab by mouth nightly. Indications: arpit associated with bipolar disorder  Qty: 30 Tab, Refills: 0      traZODone (DESYREL) 50 mg tablet Take 1 Tab by mouth nightly as needed for Sleep (For insomnia). Indications: insomnia associated with depression  Qty: 30 Tab, Refills: 0           CONTINUE these medications which have CHANGED    Details   hydrOXYzine HCL (ATARAX) 50 mg tablet Take 1 Tab by mouth three (3) times daily as needed for Anxiety for up to 10 days. Indications: anxious  Qty: 90 Tab, Refills: 0      lithium carbonate 150 mg capsule Take 5 Caps by mouth nightly.  Indications: arpit associated with bipolar disorder  Qty: 150 Cap, Refills: 0           STOP taking these medications       divalproex DR (Depakote) 500 mg tablet Comments:   Reason for Stopping:         sertraline (ZOLOFT) 50 mg tablet Comments:   Reason for Stopping:             The patient's chart, MAR and AVS were reviewed by Thu Yanez, PHARMD.

## 2020-10-12 NOTE — BH NOTES
0900 PRN Medication Documentation    Specific patient behavior that led to need for PRN medication: anxiety, concerned about discharge   Staff interventions attempted prior to PRN being given: reassurance, answering questions    PRN medication given: Atarax 50 mg po  Patient response/effectiveness of PRN medication: 0950 Pt appears calm

## 2020-10-12 NOTE — BH NOTES
Behavioral Health Transition Record to Provider    Patient Name: Paresh Toney  YOB: 1990  Medical Record Number: [id number]  Date of Admission: 10/6/2020  Date of Discharge: 10/12/2020    Attending Provider: Magdalena Farfan MD  Discharging Provider: Karl Johnson MD  To contact this individual call [phone] and ask the  to page. If unavailable, ask to be transferred to [facility] Provider on call. [facility] Provider will be available on call 24/7 and during holidays. Primary Care Provider: Poonam Subramanian NP    No Known Allergies    Reason for Admission: CHIEF COMPLAINT:  \"I feel like a manic. \"     HISTORY OF PRESENT ILLNESS:  The patient is a 64-year-old  man who is currently brought to the ED by police. [facility] had reportedly been agitated, pushed over trash cans his neighborhood and had been acting in a bizarre manner.  After his arrival on the unit, he has been talking rapidly, keeps saying that his thoughts are racing and his activity levels are increased.  Describes himself as feeling like he is going to explode.  States that after he was admitted the hospital he felt somewhat better.  Valproic level on admission was 54 and lithium was 0.48, but these do not appear to be trough levels.  He states that his condition worsened after he was started 2 weeks ago on Zoloft by his outpatient provider, Ms. Mary Bowman, who is a nurse practitioner. Kory Katieton that he has felt dysphoric with the Zoloft, felt that his thoughts were racing and he could not slow them down and he struggled to sleep. [facility] has felt more impulsive and gets easily agitated and irritable. Sandeep Manzoari, denies any aggression or violence in the last 2 weeks. [facility] gives a history of a similar episode when he was started on Wellbutrin several years ago.       Admission Diagnosis: Bipolar disorder ([facility] [address] [F31.9]    * No surgery found *    Results for orders placed or performed during the hospital encounter of 10/06/20   URINE CULTURE HOLD SAMPLE    Specimen: Serum   Result Value Ref Range    Urine culture hold        Urine on hold in Microbiology dept for 2 days. If unpreserved urine is submitted, it cannot be used for addtional testing after 24 hours, recollection will be required. SARS-COV-2, PCR    Specimen: Nasopharyngeal   Result Value Ref Range    Specimen source Nasopharyngeal      SARS-CoV-2 Not detected NOTD     CBC WITH AUTOMATED DIFF   Result Value Ref Range    WBC 7.4 4.1 - 11.1 K/uL    RBC 4.57 4.10 - 5.70 M/uL    HGB 14.3 12.1 - 17.0 g/dL    HCT 43.6 36.6 - 50.3 %    MCV 95.4 80.0 - 99.0 FL    MCH 31.3 26.0 - 34.0 PG    MCHC 32.8 30.0 - 36.5 g/dL    RDW 11.8 11.5 - 14.5 %    PLATELET 836 963 - 076 K/uL    MPV 10.7 8.9 - 12.9 FL    NRBC 0.0 0  WBC    ABSOLUTE NRBC 0.00 0.00 - 0.01 K/uL    NEUTROPHILS 72 32 - 75 %    LYMPHOCYTES 20 12 - 49 %    MONOCYTES 6 5 - 13 %    EOSINOPHILS 1 0 - 7 %    BASOPHILS 1 0 - 1 %    IMMATURE GRANULOCYTES 0 0.0 - 0.5 %    ABS. NEUTROPHILS 5.4 1.8 - 8.0 K/UL    ABS. LYMPHOCYTES 1.5 0.8 - 3.5 K/UL    ABS. MONOCYTES 0.4 0.0 - 1.0 K/UL    ABS. EOSINOPHILS 0.1 0.0 - 0.4 K/UL    ABS. BASOPHILS 0.0 0.0 - 0.1 K/UL    ABS. IMM. GRANS. 0.0 0.00 - 0.04 K/UL    DF AUTOMATED     METABOLIC PANEL, COMPREHENSIVE   Result Value Ref Range    Sodium 138 136 - 145 mmol/L    Potassium 3.9 3.5 - 5.1 mmol/L    Chloride 106 97 - 108 mmol/L    CO2 25 21 - 32 mmol/L    Anion gap 7 5 - 15 mmol/L    Glucose 109 (H) 65 - 100 mg/dL    BUN 16 6 - 20 MG/DL    Creatinine 1.04 0.70 - 1.30 MG/DL    BUN/Creatinine ratio 15 12 - 20      GFR est AA >60 >60 ml/min/1.73m2    GFR est non-AA >60 >60 ml/min/1.73m2    Calcium 10.0 8.5 - 10.1 MG/DL    Bilirubin, total 0.3 0.2 - 1.0 MG/DL    ALT (SGPT) 34 12 - 78 U/L    AST (SGOT) 26 15 - 37 U/L    Alk.  phosphatase 75 45 - 117 U/L    Protein, total 8.1 6.4 - 8.2 g/dL    Albumin 4.2 3.5 - 5.0 g/dL    Globulin 3.9 2.0 - 4.0 g/dL    A-G Ratio 1.1 1.1 - 2.2 SAMPLES BEING HELD   Result Value Ref Range    SAMPLES BEING HELD 1BLU     COMMENT        Add-on orders for these samples will be processed based on acceptable specimen integrity and analyte stability, which may vary by analyte.    VALPROIC ACID   Result Value Ref Range    Valproic acid 54 50 - 100 ug/ml   LITHIUM   Result Value Ref Range    Lithium level 0.48 (L) 0.60 - 1.20 MMOL/L    Reported dose date NOT PROVIDED      Reported dose time: NOT PROVIDED      Reported dose: NOT PROVIDED UNITS   ETHYL ALCOHOL   Result Value Ref Range    ALCOHOL(ETHYL),SERUM <10 <10 MG/DL   ACETAMINOPHEN   Result Value Ref Range    Acetaminophen level <2 (L) 10 - 30 ug/mL   SALICYLATE   Result Value Ref Range    Salicylate level <0.6 (L) 2.8 - 20.0 MG/DL   DRUG SCREEN, URINE   Result Value Ref Range    AMPHETAMINES Negative NEG      BARBITURATES Negative NEG      BENZODIAZEPINES Negative NEG      COCAINE Negative NEG      METHADONE Negative NEG      OPIATES Negative NEG      PCP(PHENCYCLIDINE) Negative NEG      THC (TH-CANNABINOL) Positive (A) NEG      Drug screen comment (NOTE)    SARS-COV-2   Result Value Ref Range    Specimen source Nasopharyngeal      Specimen source Nasopharyngeal      COVID-19 rapid test Not detected NOTD      Specimen type NP Swab      Health status NP Swab     GLUCOSE, FASTING   Result Value Ref Range    Glucose 103 (H) 65 - 100 MG/DL   LIPID PANEL   Result Value Ref Range    LIPID PROFILE          Cholesterol, total 170 <200 MG/DL    Triglyceride 113 <150 MG/DL    HDL Cholesterol 42 MG/DL    LDL, calculated 105.4 (H) 0 - 100 MG/DL    VLDL, calculated 22.6 MG/DL    CHOL/HDL Ratio 4.0 0.0 - 5.0     LITHIUM   Result Value Ref Range    Lithium level 0.67 0.60 - 1.20 MMOL/L    Reported dose date NOT PROVIDED      Reported dose time: NOT PROVIDED      Reported dose: NOT PROVIDED UNITS   VALPROIC ACID   Result Value Ref Range    Valproic acid 91 50 - 100 ug/ml   LITHIUM   Result Value Ref Range    Lithium level 0. 96 0.60 - 1.20 MMOL/L    Reported dose date NOT PROVIDED      Reported dose time: NOT PROVIDED      Reported dose: NOT PROVIDED UNITS       Immunizations administered during this encounter:   Immunization History   Administered Date(s) Administered    DTAP Vaccine 1990, 01/20/1989, 04/28/1989, 03/01/1990, 07/29/1993    HIB Vaccine 03/01/1990    Hepatitis A Vaccine 08/09/2007    Hepatitis B Vaccine 08/26/1992, 10/06/1992, 07/28/1993    IPV 1988, 01/20/1989, 03/01/1990, 07/29/1993    Influenza Vaccine Split 11/23/2012    MMR Vaccine 11/24/1989, 07/29/1993    Meningococcal Vaccine 08/09/2007    PPD 09/27/1993    TD Vaccine 07/13/2000    Tdap 02/22/2014    Varicella Virus Vaccine Live 08/28/1990       Screening for Metabolic Disorders for Patients on Antipsychotic Medications  (Data obtained from the EMR)    Estimated Body Mass Index  Estimated body mass index is 25.24 kg/m² as calculated from the following:    Height as of this encounter: 5' 8\" (1.727 m). Weight as of this encounter: 75.3 kg (166 lb). Vital Signs/Blood Pressure  Visit Vitals  /78   Pulse 66   Temp 97.9 °F (36.6 °C)   Resp 16   Ht 5' 8\" (1.727 m)   Wt 75.3 kg (166 lb)   SpO2 96%   BMI 25.24 kg/m²       Blood Glucose/Hemoglobin A1c  Lab Results   Component Value Date/Time    Glucose 103 (H) 10/07/2020 05:41 AM    Glucose (POC) 98 07/28/2013 11:57 AM       No results found for: HBA1C, HGBE8, [id number]     Lipid Panel  Lab Results   Component Value Date/Time    Cholesterol, total 170 10/07/2020 05:41 AM    HDL Cholesterol 42 10/07/2020 05:41 AM    LDL, calculated 105.4 (H) 10/07/2020 05:41 AM    Triglyceride 113 10/07/2020 05:41 AM    CHOL/HDL Ratio 4.0 10/07/2020 05:41 AM        Discharge Diagnosis: Bipolar 1 disorder, most recent episode arpit without psychotic symptoms, antidepressant induced.     Discharge Plan: Patient discharged home into care of family with follow up through [address] [place] [address] Pkwy patient given information per his request for insight physician if insurance becomes active. The patient Jasivr Allen exhibits the ability to control behavior in a less restrictive environment. Patient's level of functioning is improving. No assaultive/destructive behavior has been observed for the past 24 hours. No suicidal/homicidal threat or behavior has been observed for the past 24 hours. There is no evidence of serious medication side effects. Patient has not been in physical or protective restraints for at least the past 24 hours. If weapons involved, how are they secured? No weapons involved     Is patient aware of and in agreement with discharge plan? He is aware of discharge and is in agreement     Arrangements for medication:  Prescriptions given to patient, sent to outpatient pharmacy     Copy of discharge instructions to provider?:  Yasmin RICHMOND    Arrangements for transportation home: Parents to  patient    Keep all follow up appointments as scheduled, continue to take prescribed medications per physician instructions. Mental health crisis number:  469 or your local mental health crisis line number at [phone]. YOUR MISSING CLOTHES: We apologize that your clothes such as your white polo shirt and plaid shorts went missing during your stay. Nursing staff have searched not only your room but all patient rooms, laundry room and our donation closet. We have not found them on R [facility] 21 can call the patient advocate office at [facility] 70 is a document that supports someone when they are having thoughts of suicide. Warning Signs that indicate a suicidal crisis may be developing: What (situations, thoughts, feelings, body sensations, behaviors, etc.) do you experience that lets you know you are beginning to think about suicide? 1. Racing thoughts   2. Moods that rapidly change  3.  Suicidal thinking    Internal Coping Strategies:  What things can I do (relaxation techniques, hobbies, physical activities, etc.) to take my mind off my problems without contacting another person? 1. Talking to my support system  2. Going outside for a walk   3. Sitting in quiet place    People and social settings that provide distraction: Who can I call or where can I go to distract me? 1. Name: mom and dad   Phone: their numbers are programmed into your phone\  2. Talk to your roommates    People whom I can ask for help: Who can I call when I need help - for example, friends, family, clergy, someone else? 1. Name: mom and dad   Phone: their number are programmed into your phone  2. Talk to your roommates    Professionals or [address] [place] [address] Blvd I can contact during a crisis: Who can I call for help - for example, my doctor, my psychiatrist, my psychologist, a mental health provider, a suicide hotline? 1. Clinician Name:    Roberto Contreras                                    Phone: [phone]       3. Suicide Prevention Lifeline: 4-336-526-TALK (1512)    4. [address] [place] [address] Emergency Services -  for example, [address] [place] [address] suicide hotline, [address] [place] [address]: 211      Emergency Services Address: [address] [place] [address] 200 [facility]       Emergency Services JLSUO:[phone]     Making the environment safe: How can I make my environment (house/apartment/living space) safer? For example, can I remove guns, medications, and other items? 1. Program you local crisis number at [phone]  into your phone. 2. Have your support system program your local crisis number into their phone. DISCHARGE SUMMARY from Nurse    PATIENT INSTRUCTIONS:    What to do at Home:  Recommended activity: Activity as tolerated,     *  Please give a list of your current medications to your Primary Care Provider.     *  Please update this list whenever your medications are discontinued, doses are      changed, or new medications (including over-the-counter products) are added. *  Please carry medication information at all times in case of emergency situations. These are general instructions for a healthy lifestyle:    No smoking/ No tobacco products/ Avoid exposure to second hand smoke  Surgeon General's Warning:  Quitting smoking now greatly reduces serious risk to your health. Obesity, smoking, and sedentary lifestyle greatly increases your risk for illness    A healthy diet, regular physical exercise & weight monitoring are important for maintaining a healthy lifestyle    You may be retaining fluid if you have a history of heart failure or if you experience any of the following symptoms:  Weight gain of 3 pounds or more overnight or 5 pounds in a week, increased swelling in our hands or feet or shortness of breath while lying flat in bed. Please call your doctor as soon as you notice any of these symptoms; do not wait until your next office visit. Discharge Medication List and Instructions:   Current Discharge Medication List      START taking these medications    Details   divalproex ER (DEPAKOTE ER) 500 mg ER tablet Take 3 Tabs by mouth nightly. Indications: arpit associated with bipolar disorder  Qty: 90 Tab, Refills: 0      QUEtiapine (SEROquel) 200 mg tablet Take 1 Tab by mouth nightly. Indications: arpit associated with bipolar disorder  Qty: 30 Tab, Refills: 0      traZODone (DESYREL) 50 mg tablet Take 1 Tab by mouth nightly as needed for Sleep (For insomnia). Indications: insomnia associated with depression  Qty: 30 Tab, Refills: 0         CONTINUE these medications which have CHANGED    Details   hydrOXYzine HCL (ATARAX) 50 mg tablet Take 1 Tab by mouth three (3) times daily as needed for Anxiety for up to 10 days. Indications: anxious  Qty: 90 Tab, Refills: 0      lithium carbonate 150 mg capsule Take 5 Caps by mouth nightly.  Indications: arpit associated with bipolar disorder  Qty: 150 Cap, Refills: 0         STOP taking these medications       divalproex DR (Depakote) 500 mg tablet Comments:   Reason for Stopping:         sertraline (ZOLOFT) 50 mg tablet Comments:   Reason for Stopping:               Unresulted Labs (24h ago, onward)    None        To obtain results of studies pending at discharge, please contact [phone]    Follow-up Information     Follow up With Specialties Details Why Contact Info    Leonel Sepulveda NP Nurse Practitioner   [address]  [phone]      [facility]  Call on 10/12/2020 Call and ask to speak to [facility] in schedule after your new incurance is activated and verified, to get set up with an appointment for medication management. [address] [place] [address] [facility]   Call on 10/12/2020 Call [phone] 8am-5pm monday-friday and ask to complete an intake to be linked with mental health services for psychiatry and and the[facility] services. This is your best option for treatment until your insurance can be verified. [address] [place], [place] [address]y   [address] [facility], [address] [place] [address]  [phone]           Advanced Directive:   Does the patient have an appointed surrogate decision maker? No  Does the patient have a Medical Advance Directive? No  Does the patient have a Psychiatric Advance Directive? No  If the patient does not have a surrogate or Medical Advance Directive AND Psychiatric Advance Directive, the patient was offered information on these advance directives Patient declined to complete    Patient Instructions: Please continue all medications until otherwise directed by physician. Tobacco Cessation Discharge Plan:   Is the patient a smoker and needs referral for smoking cessation? Yes  Patient referred to the following for smoking cessation with an appointment? Refused     Patient was offered medication to assist with smoking cessation at discharge?  Refused  Was education for smoking cessation added to the discharge instructions? Yes    Alcohol/Substance Abuse Discharge Plan:   Does the patient have a history of substance/alcohol abuse and requires a referral for treatment? Yes  Patient referred to the following for substance/alcohol abuse treatment with an appointment? Refused  Patient was offered medication to assist with alcohol cessation at discharge? Refused  Was education for substance/alcohol abuse added to discharge instructions? No    Patient discharged to Home; discussed with patient/caregiver and provided to the patient/caregiver either in hard copy or electronically.

## 2020-10-12 NOTE — PROGRESS NOTES
Laboratory Monitoring for Divalproex/Valproic Acid: This patient is currently prescribed the following medication(s):   Current Facility-Administered Medications   Medication Dose Route Frequency    lithium carbonate capsule 750 mg  750 mg Oral QHS    QUEtiapine (SEROquel) tablet 200 mg  200 mg Oral QHS    divalproex ER (DEPAKOTE ER) 24 hour tablet 1,500 mg  1,500 mg Oral QHS    nicotine (NICODERM CQ) 14 mg/24 hr patch 1 Patch  1 Patch TransDERmal DAILY     The following labs have been completed for monitoring of valproic acid:    Valproic Acid Serum Concentration  Lab Results   Component Value Date/Time    Valproic acid 91 10/10/2020 06:38 PM       Hepatic Function  Lab Results   Component Value Date/Time    Bilirubin, total 0.3 10/06/2020 06:13 PM    Protein, total 8.1 10/06/2020 06:13 PM    Albumin 4.2 10/06/2020 06:13 PM    Globulin 3.9 10/06/2020 06:13 PM    A-G Ratio 1.1 10/06/2020 06:13 PM    ALT (SGPT) 34 10/06/2020 06:13 PM    AST (SGOT) 26 10/06/2020 06:13 PM    Alk. phosphatase 75 10/06/2020 06:13 PM     Hematology  Lab Results   Component Value Date/Time    WBC 7.4 10/06/2020 06:13 PM    RBC 4.57 10/06/2020 06:13 PM    HGB 14.3 10/06/2020 06:13 PM    HCT 43.6 10/06/2020 06:13 PM    MCV 95.4 10/06/2020 06:13 PM    MCH 31.3 10/06/2020 06:13 PM    MCHC 32.8 10/06/2020 06:13 PM    RDW 11.8 10/06/2020 06:13 PM    PLATELET 703 31/20/0065 06:13 PM     Assessment/Plan:  A valproic acid level was drawn on 10/10 @ 1838 and found to be 91 mcg/mL. This level is reflective steady state trough and is therapeutic. Based on this level, recommend continuing the current dose upon discharge.       Em Guardado, PharmD, BCPP, [facility] Specialist, [address] [place] [address]

## 2020-10-12 NOTE — DISCHARGE SUMMARY
DISCHARGE SUMMARY    Some parts of the discharge summary are from the initial Psychiatric interview that was done on admission by the admitting psychiatrist.      Date of Admission: 10/6/2020    Date of Discharge:10/12/2020     TYPE OF DISCHARGE:   REGULAR -  YES    AMA  RELEASED BY THE [facility]DO COURT     CHIEF COMPLAINT:  \"I feel like a manic. \"     HISTORY OF PRESENT ILLNESS:  The patient is a 57-year-old  man who is currently brought to the ED by police. He had reportedly been agitated, pushed over trash cans his neighborhood and had been acting in a bizarre manner. After his arrival on the unit, he has been talking rapidly, keeps saying that his thoughts are racing and his activity levels are increased. Describes himself as feeling like he is going to explode. States that after he was admitted the hospital he felt somewhat better. Valproic level on admission was 54 and lithium was 0.48, but these do not appear to be trough levels. He states that his condition worsened after he was started 2 weeks ago on Zoloft by his outpatient provider, Ms. Galen Johnson, who is a nurse practitioner. States that he has felt dysphoric with the Zoloft, felt that his thoughts were racing and he could not slow them down and he struggled to sleep. He has felt more impulsive and gets easily agitated and irritable. However, denies any aggression or violence in the last 2 weeks. He gives a history of a similar episode when he was started on Wellbutrin several years ago.     PAST MEDICAL HISTORY:  Reviewed as per the history and physical exam.      Past Medical History:   Diagnosis Date    ADHD (attention deficit hyperactivity disorder)     Anxiety     [facility]     Borderline personality disorder ([facility]     Depression     Mood disorder ([facility]     Substance abuse ([facility]     Suicidal thoughts      Prior to Admission medications    Medication Sig Start Date End Date Taking?  Authorizing Provider divalproex ER (DEPAKOTE ER) 500 mg ER tablet Take 3 Tabs by mouth nightly. Indications: arpit associated with bipolar disorder 10/12/20  Yes Diana Rogel MD   hydrOXYzine HCL (ATARAX) 50 mg tablet Take 1 Tab by mouth three (3) times daily as needed for Anxiety for up to 10 days. Indications: anxious 10/12/20 10/22/20 Yes Diana Rogel MD   divalproex DR (Depakote) 500 mg tablet Take 500mg every morning and 750mg every evening   Yes Provider, Historical   lithium carbonate 300 mg capsule Take 600 mg by mouth nightly. Yes Provider, Historical   sertraline (ZOLOFT) 50 mg tablet Take 50 mg by mouth daily. Yes Provider, Historical   hydrOXYzine HCL (ATARAX) 50 mg tablet Take 50 mg by mouth nightly as needed for Sleep. Yes Provider, Historical   lithium carbonate 150 mg capsule Take 5 Caps by mouth nightly. Indications: arpit associated with bipolar disorder 10/12/20  Yes Diana Rogel MD   QUEtiapine (SEROquel) 200 mg tablet Take 1 Tab by mouth nightly. Indications: arpit associated with bipolar disorder 10/12/20  Yes Diana Rogel MD   traZODone (DESYREL) 50 mg tablet Take 1 Tab by mouth nightly as needed for Sleep (For insomnia).  Indications: insomnia associated with depression 10/12/20  Yes Diana Rogel MD     Vitals:    10/10/20 1946 10/11/20 0829 10/11/20 1630 10/12/20 0745   BP: (!) 140/90 116/74 128/78 114/78   Pulse: 76 76 85 66   Resp: 16 16 16 16   Temp: 97.4 °F (36.3 °C) 97.7 °F (36.5 °C) 97.9 °F (36.6 °C) 97.9 °F (36.6 °C)   SpO2: 99% 95% 98% 96%   Weight:  75.3 kg (166 lb)     Height:         Lab Results   Component Value Date/Time    WBC 7.4 10/06/2020 06:13 PM    Hemoglobin (POC) 13.9 07/28/2013 11:57 AM    HGB 14.3 10/06/2020 06:13 PM    Hematocrit (POC) 41 07/28/2013 11:57 AM    HCT 43.6 10/06/2020 06:13 PM    PLATELET 396 54/24/5742 06:13 PM    MCV 95.4 10/06/2020 06:13 PM     Lab Results   Component Value Date/Time    Sodium 138 10/06/2020 06:13 PM    Potassium 3.9 10/06/2020 06:13 PM Chloride 106 10/06/2020 06:13 PM    CO2 25 10/06/2020 06:13 PM    Anion gap 7 10/06/2020 06:13 PM    Glucose 103 (H) 10/07/2020 05:41 AM    BUN 16 10/06/2020 06:13 PM    Creatinine 1.04 10/06/2020 06:13 PM    BUN/Creatinine ratio 15 10/06/2020 06:13 PM    GFR est AA >60 10/06/2020 06:13 PM    GFR est non-AA >60 10/06/2020 06:13 PM    Calcium 10.0 10/06/2020 06:13 PM    Bilirubin, total 0.3 10/06/2020 06:13 PM    Alk. phosphatase 75 10/06/2020 06:13 PM    Protein, total 8.1 10/06/2020 06:13 PM    Albumin 4.2 10/06/2020 06:13 PM    Globulin 3.9 10/06/2020 06:13 PM    A-G Ratio 1.1 10/06/2020 06:13 PM    ALT (SGPT) 34 10/06/2020 06:13 PM    AST (SGOT) 26 10/06/2020 06:13 PM     Lab Results   Component Value Date/Time    Valproic acid 91 10/10/2020 06:38 PM     Lab Results   Component Value Date/Time    Lithium level 0.96 10/12/2020 05:19 AM     RADIOLOGY REPORTS:(reviewed/updated 10/12/2020)  No results found.     PAST PSYCHIATRIC HISTORY:  The patient reports that he was diagnosed with bipolar disorder many years ago and has had several psychiatric hospitalizations including at [facility] as well as [facility].  There is a history of occasional THC use and his urine drug screen was positive for this. Denies any prior history of suicide attempts. As noted above, there is a history of manic episode developing after he was started on Wellbutrin.     PSYCHOSOCIAL HISTORY:  The patient currently lives in [place] where he lives with three other roommates. He is single, has never been  and does not have any children. He was born in [place], but grew up for the most part in the [place] area. His parents live in [place] also and appear to be quite supportive. Currently, he works for [employer] and says he enjoys his job. Denies any major legal or financial stressors.     MENTAL STATUS EXAM:  The patient is a young  man who is dressed in hospital apparel.   He is calm and cooperative during the interview, but speech output is increased and he speaks rapidly. Psychomotor activity is mildly increased. He is somewhat fidgety. Speech is spontaneous and coherent. Mood is reported as being irritable, but affect is reactive and he was calm and pleasant during the interview. Denies any active suicidal ideation or plan. Denies any perceptual abnormalities. Denies any delusions. His thought process is mildly disorganized at times. Cognitively, he is awake and alert, oriented to time, place, and person. Intelligence is average, memory is intact, and fund of knowledge is adequate. Insight is partial.  Judgment is poor.     ASSESSMENT AND PLAN/DIAGNOSIS:  Bipolar 1 disorder, most recent episode arpit without psychotic symptoms, antidepressant induced.     I will continue his inpatient stay. He will be provided with support and attend groups. Estimated length of stay is 5-7 days. His strengths include his ability to seek help and support from his family. Course in the Hospital:     Patient was admitted to the inpatient psychiatry unit for acute psychiatric stabilization in regards to symptomatology as described in the HPI above and placed on Q15 minute checks and withdrawal precautions. While on the unit Arleene Claude was involved in individual, group, occupational and milieu therapy. He was started back on his usual medication regimen as well as PRN medications including Lithium and Depakote as well as augmentation with Seroquel. Zoloft was stopped due to his ongoing manic symptoms. He improved gradually and was able to integrate into the milieu with help from the nursing staff. Patients symptoms improved gradually including labile moods, racing thoughts, excessive speech, poor sleep, SI. He was quite on the unit, appropriate in his interactions, and cooperative with medications and the unit routine.  Please see individual progress notes for more specific details regarding patient's hospitalization course. Patient was discharged as per the plan. He had been doing well on the unit as per the report of the nursing staff and my observations. No PRN medication for agitation, seclusion or restraints were required during the last 48 hours of her stay. Eli Canales had improved progressively to the point of being stable for discharge and outpatient FU. At this time he did not offer any complaints. Patient denied any SI or HI. Denied any AH or VH. He denied any delusions. Was not considered a danger to self or to others and is safe for discharge. Will FU with his appointments and remains motivated to be in treatment. The patient verbalized understanding of his discharge instructions. Lithium level at DC was 0.96 and Valproic acid was 91. DISCHARGE DIAGNOSIS:  Bipolar 1 disorder, most recent episode arpit without psychotic symptoms, antidepressant induced. MENTAL STATUS EXAM ON DISCHARGE:    General appearance:   Eli Canales is a 26 y.o. WHITE OR  male who is well groomed, psychomotor activity is WNL  Eye contact: makes good eye contact  Speech: Spontaneous and coherent  Affect : Euthymic  Mood: \"OK\"  Thought Process: Logical, goal directed  Perception: Denies any AH or VH. Thought Content: Denies any SI or Plan  Insight: Partial  Judgement: Fair  Cognition: Intact grossly. Current Discharge Medication List      START taking these medications    Details   divalproex ER (DEPAKOTE ER) 500 mg ER tablet Take 3 Tabs by mouth nightly. Indications: arpit associated with bipolar disorder  Qty: 90 Tab, Refills: 0      QUEtiapine (SEROquel) 200 mg tablet Take 1 Tab by mouth nightly. Indications: arpit associated with bipolar disorder  Qty: 30 Tab, Refills: 0      traZODone (DESYREL) 50 mg tablet Take 1 Tab by mouth nightly as needed for Sleep (For insomnia).  Indications: insomnia associated with depression  Qty: 30 Tab, Refills: 0         CONTINUE these medications which have CHANGED    Details   hydrOXYzine HCL (ATARAX) 50 mg tablet Take 1 Tab by mouth three (3) times daily as needed for Anxiety for up to 10 days. Indications: anxious  Qty: 90 Tab, Refills: 0      lithium carbonate 150 mg capsule Take 5 Caps by mouth nightly. Indications: arpit associated with bipolar disorder  Qty: 90 Cap, Refills: 0         STOP taking these medications       divalproex DR (Depakote) 500 mg tablet Comments:   Reason for Stopping:         sertraline (ZOLOFT) 50 mg tablet Comments:   Reason for Stopping:              Lab Results   Component Value Date/Time    Valproic acid 91 10/10/2020 06:38 PM     Lab Results   Component Value Date/Time    Lithium level 0.96 10/12/2020 05:19 AM     Follow-up Information     Follow up With Specialties Details Why Contact Info    Roge Gupta NP Nurse Practitioner   [address] [place] [address]  [phone]      [facility]  Call on 10/12/2020 And ask to speak to Varinder Wolff to get set up with an appointment for medication management. [address]  [phone]        WOUND CARE: none needed. PROGNOSIS:   Good / Fair based on nature of patient's pathology/ies and treatment compliance issues. Prognosis is greatly dependent upon patient's ability to  follow up on psychiatric/psychotherapy appointments as well as to comply with psychiatric medications as prescribed.

## 2020-10-12 NOTE — INTERDISCIPLINARY ROUNDS
Behavioral Health Interdisciplinary Rounds Patient Name: Jeanmarie Mcgill  Age: 26 y.o. Room/Bed:  725/ Primary Diagnosis: <principal problem not specified> Admission Status: Voluntary Readmission within 30 days: no 
Power of  in place: no 
Patient requires a blocked bed: no          Reason for blocked bed: VTE Prophylaxis: No 
 
Mobility needs/Fall risk: no 
Flu Vaccine : no  
Nutritional Plan: no 
Consults:         
Labs/Testing due today?: yes Sleep hours:  7 Participation in Care/Groups:  yes Medication Compliant?: Yes PRNS (last 24 hours): Antianxiety Restraints (last 24 hours):  no 
  
CIWA (range last 24 hours): COWS (range last 24 hours): Alcohol screening (AUDIT) completed -   AUDIT Score: 3 If applicable, date SBIRT discussed in treatment team AND documented:  
AUDIT Screen Score: AUDIT Score: 3 Tobacco - patient is a smoker: Have You Used Tobacco in the Past 30 Days: Yes Illegal Drugs use: Have You Used Any Illegal Substances Over the Past 12 Months: Yes 
 
24 hour chart check complete: yes Patient goal(s) for today: prepare for discharge Treatment team focus/goals: reconcile medications and facilitate discharge Progress note: Pt is alert, oriented, clam, cooperative and psychiatrically stable for discharge. LOS:  6  Expected LOS:  6 
  
Financial concerns/prescription coverage: Pt to obtain insurance info from parents and provide to hospital 
Family contact:  He has contacted his family. Family requesting physician contact today:   
Discharge plan: he will return to his home with his roommates Access to weapons :  no Outpatient provider(s): to be linked with Jarett Lui Patient's preferred phone number for follow up call : [phone] [facility] Patient's preferred email for telehealth:  [email or web address] 
  
 Participating treatment team members: HANS Velasquez - Dr. Spencer Caldwell - Megan Wyatt RN - Jaswinder Tadeo.

## 2020-10-12 NOTE — PROGRESS NOTES
Pt receiving continuous q15m safety checks, pt currently awake resting comfortably in bed. No distress noted, respiratory WNL. Supplemental lighting utilized. Problem: Altered Thought Process (Adult/Pediatric)  Goal: Interventions  Outcome: Progressing Towards Goal     Problem: Falls - Risk of  Goal: *Absence of Falls  Description: Document Agapito Fall Risk and appropriate interventions in the flowsheet.   Outcome: Progressing Towards Goal  Note: Fall Risk Interventions:            Medication Interventions: Teach patient to arise slowly

## 2020-10-12 NOTE — PROGRESS NOTES
Laboratory Monitoring for Lithium: This patient is currently prescribed the following medication(s):   Current Facility-Administered Medications   Medication Dose Route Frequency    lithium carbonate capsule 750 mg  750 mg Oral QHS    QUEtiapine (SEROquel) tablet 200 mg  200 mg Oral QHS    divalproex ER (DEPAKOTE ER) 24 hour tablet 1,500 mg  1,500 mg Oral QHS    nicotine (NICODERM CQ) 14 mg/24 hr patch 1 Patch  1 Patch TransDERmal DAILY       The following labs have been completed for monitoring of lithium:    Lithium Serum Concentration  Lab Results   Component Value Date/Time    Lithium level 0.96 10/12/2020 05:19 AM       Renal Function and Chemistry  Lab Results   Component Value Date/Time    Sodium 138 10/06/2020 06:13 PM    Potassium 3.9 10/06/2020 06:13 PM    Chloride 106 10/06/2020 06:13 PM    CO2 25 10/06/2020 06:13 PM    Anion gap 7 10/06/2020 06:13 PM    BUN 16 10/06/2020 06:13 PM    Creatinine 1.04 10/06/2020 06:13 PM    BUN/Creatinine ratio 15 10/06/2020 06:13 PM       Assessment/Plan:  A lithium level was drawn on 10/12 @ 0519 and found to be 0.96 mmol/L. This level was drawn ~9hr following the previous dose and is reflective of steady state trough. Therapeutic lithium level is generally 0.8-1.2mmol/L (however, some patients may have a target level lower than 0.8mmol/L). This level is considered therapeutic. Recommend continuing current dose upon discharge.      Brad Ortiz, PharmD, BCPP, [facility], [facility]

## 2020-10-12 NOTE — PROGRESS NOTES
Problem: Altered Thought Process (Adult/Pediatric)  Goal: *STG: Participates in treatment plan  Outcome: Progressing Towards Goal  Note: Out on unit passively engaged. Mood and affect anxious to euthymic. Denies SI, thoughts organized, demonstrates appropriate behavior a d ability to follow staff direction. Daily goal is to d/c home.  Staff focus is on d/c planning and arranging follow up care  Goal: *STG: Seeks staff when feelings of anxiety and fear arise  Outcome: Progressing Towards Goal  Goal: *STG: Attends activities and groups  Outcome: Progressing Towards Goal  Goal: *STG: Decreased delusional thinking  Outcome: Progressing Towards Goal  Goal: *STG: Decreased hallucinations  Outcome: Progressing Towards Goal

## 2021-02-22 ENCOUNTER — TRANSCRIBE ORDER (OUTPATIENT)
Dept: SCHEDULING | Age: 31
End: 2021-02-22

## 2021-02-22 DIAGNOSIS — E05.90 HYPERTHYROIDISM: Primary | ICD-10-CM

## 2021-02-24 ENCOUNTER — HOSPITAL ENCOUNTER (OUTPATIENT)
Dept: ULTRASOUND IMAGING | Age: 31
Discharge: HOME | End: 2021-02-24
Attending: FAMILY MEDICINE
Payer: COMMERCIAL

## 2021-02-24 DIAGNOSIS — E05.90 HYPERTHYROIDISM: ICD-10-CM

## 2021-02-24 PROCEDURE — 76536 US EXAM OF HEAD AND NECK: CPT

## 2021-04-12 ENCOUNTER — VIRTUAL VISIT (OUTPATIENT)
Dept: ENDOCRINOLOGY | Age: 31
End: 2021-04-12

## 2021-04-12 DIAGNOSIS — E05.90 HYPERTHYROIDISM: Primary | ICD-10-CM

## 2021-04-12 PROCEDURE — 99204 OFFICE O/P NEW MOD 45 MIN: CPT | Performed by: INTERNAL MEDICINE

## 2021-04-12 RX ORDER — ARIPIPRAZOLE 5 MG/1
TABLET ORAL
COMMUNITY
Start: 2021-03-21

## 2021-04-12 RX ORDER — HYDROXYZINE PAMOATE 50 MG/1
CAPSULE ORAL
COMMUNITY
Start: 2021-01-05

## 2021-04-12 RX ORDER — BUPROPION HYDROCHLORIDE 75 MG/1
TABLET ORAL
COMMUNITY
Start: 2021-03-05 | End: 2021-06-18 | Stop reason: ALTCHOICE

## 2021-04-12 NOTE — PROGRESS NOTES
Chief Complaint   Patient presents with    Thyroid Problem     pcp and pharmacy verified. [phone] (M)     History of Present Illness: Luz Maria Argueta is a 26 y.o. male with a past medical hx significant for ADHD, bipolar d/o seen in referral from [facility],  for discussion related to hypERthyroidism. Notes decreased energy for 9 months- 1 year. Felt like he didn't have any energy. Low libido, whole body didn't feel like himself. Went and had blood levels tested by psychiatry. Saw Miguel and they were abnormal there as well. Had an ultrasound which was without nodules. Has gained weight since this diagnosis. Energy levels are so low he's not going anywhere in life. Has taken bentyl in the past for constipation, uses a stool softener. Endorses a little shakiness in his hands. Main symptom is nervousness, anxiety. Has worked through it- impacting social life- not able to get out as much as he wants to. No recent CT scans. Psychiatrist is Treasure Buerger, MD.     Past Medical History:   Diagnosis Date    ADHD (attention deficit hyperactivity disorder)     Anxiety     Bipolar affective disorder ([address]     Borderline [facility] disorder ([facility]     Depression     Mood disorder ([facility]     Substance abuse ([facility]     Suicidal thoughts      Past Surgical History:   Procedure Laterality Date    HX WISDOM TEETH EXTRACTION       Current Outpatient Medications   Medication Sig    ARIPiprazole (ABILIFY) 5 mg tablet TAKE 1 TABLET BY MOUTH EVERY DAY    buPROPion (WELLBUTRIN) 75 mg tablet TAKE 1 TABLET BY MOUTH EVERY DAY    hydrOXYzine pamoate (VISTARIL) 50 mg capsule TAKE 1 CAPSULE BY MOUTH TWICE DAILY AS NEEDED FOR ANXIETY    divalproex ER (DEPAKOTE ER) 500 mg ER tablet Take 3 Tabs by mouth nightly. Indications: arpit associated with bipolar disorder    lithium carbonate 150 mg capsule Take 5 Caps by mouth nightly.  Indications: arpit associated with bipolar disorder     No current facility-administered medications for this visit. no biotin     No Known Allergies  Family History   Problem Relation Age of Onset    Arthritis-rheumatoid Maternal Grandmother     Parkinsonism Maternal Grandmother     Diabetes Maternal Grandfather     Other Paternal Grandmother         brain tumor    Pacemaker Paternal Grandfather     No Known Problems Mother     No Known Problems Father     Heart Disease Brother 25        Congestive Heart Failure  - Sudden onset    No Known Problems Brother    no known history of thyroid disease in the family    Social Hx: drives a taxi  Occasional cigar  No alcohol     Review of Systems:  - Constitutional Symptoms: no fevers, chills, weight loss  - Eyes: no blurry vision or double vision  - Cardiovascular: no chest pain or palpitations  - Respiratory: no cough or shortness of breath  - Gastrointestinal: no dysphagia or abdominal pain  - Musculoskeletal: no joint pains or weakness  - Integumentary: no rashes  - Neurological: no numbness, tingling, or headaches  - Psychiatric: endorses anxiety  - Endocrine: no heat or cold intolerance, no polyuria or polydipsia    - Physical Examination:  - - GENERAL: NCAT, Appears well nourished   - EYES: EOMI, non-icteric, no proptosis   - Ear/Nose/Throat: NCAT, no visible inflammation or masses   - CARDIOVASCULAR: no cyanosis, no visible JVD   - RESPIRATORY: respiratory effort normal without any distress or labored breathing   - MUSCULOSKELETAL: Normal ROM of neck and upper extremities observed   - SKIN: No rash on face  - NEUROLOGIC:  No facial asymmetry (Cranial nerve 7 motor function), No gaze palsy   - PSYCHIATRIC: Normal affect, Normal insight and judgement     Data Reviewed:   FINDINGS:  The thyroid is homogeneous in echotexture with no mass, nodule or other  abnormality.      Bilateral cervical lauren survey was performed, with cine images of the bilateral  neck, from submandibular glands to clavicles.  No cervical lymphadenopathy is  shown.        The right lobe measures 6.0 x 1.9 x 2.4 cm and the left lobe measures 5.6 x 1.8  x 1.7 cm. The isthmus measures 0.3 cm.     IMPRESSION  Normal ultrasound examination of the thyroid. 10/2019:  TSH 4.37, Free T3. 1.12, T3 Uptake T3     Assessment/Plan: This is a 27 yo gentleman with a past medical hx significant for ADHD and bipolar d/o on lithium seen in referral from Faride[facility] Rivera [address] [place], [place] [address]DO for discussion related to thyroid dysfunction. The only labs pt can provide for me at this point are from 2019 - at that time they were suggestive of lithium induced hypOthyroidism. Will obtain labs from previous provider and assess for Grave's disease vs thyroiditis, thyroid ultrasound rules out nodular disease. 1. Hyperthyroidism  - TSH 3RD GENERATION; Future  - T4, FREE; Future  - T3 TOTAL; Future  - THYROID STIMULATING IMMUNOGLOBULIN; Future  - THYROID PEROXIDASE (TPO) AB; Future  - CBC WITH AUTOMATED DIFF; Future  - METABOLIC PANEL, COMPREHENSIVE; Future    Copy sent to:Daly Thakur DO     Return to care June 11th at 10:10    Herlinda Webber is a 26 y.o. male being evaluated by a Virtual Visit (video visit) encounter to address concerns as mentioned above. A caregiver was present when appropriate. Due to this being a TeleHealth encounter (During NRXGU-19 public health emergency), evaluation of the following organ systems was limited:     Vitals/Constitutional/EENT/Resp/CV/GI//MS/Neuro/Skin/Heme-Lymph-Imm. Pursuant to the emergency declaration under the [address] [place] [address] authority and the [employer] and [employer] Act, this Virtual Visit was conducted with patient's (and/or legal guardian's) consent, to reduce the risk of exposure to COVID-19 and provide necessary medical care.       Services were provided through a video synchronous discussion virtually to substitute for in-person encounter. --Lisette Grimes MD on 4/12/2021 at 11:51 AM    An electronic signature was used to authenticate this note.

## 2021-04-13 DIAGNOSIS — E03.2 HYPOTHYROIDISM DUE TO MEDICATION: Primary | ICD-10-CM

## 2021-04-13 RX ORDER — LEVOTHYROXINE SODIUM 50 UG/1
50 TABLET ORAL
Qty: 90 TAB | Refills: 2 | Status: SHIPPED | OUTPATIENT
Start: 2021-04-13 | End: 2021-06-23 | Stop reason: SDUPTHER

## 2021-06-11 ENCOUNTER — HOSPITAL ENCOUNTER (EMERGENCY)
Age: 31
Discharge: HOME | End: 2021-06-11
Attending: EMERGENCY MEDICINE
Payer: MEDICAID

## 2021-06-11 VITALS
TEMPERATURE: 98.1 F | SYSTOLIC BLOOD PRESSURE: 142 MMHG | OXYGEN SATURATION: 99 % | DIASTOLIC BLOOD PRESSURE: 85 MMHG | RESPIRATION RATE: 18 BRPM | HEART RATE: 85 BPM

## 2021-06-11 DIAGNOSIS — R45.4 ANGER REACTION: Primary | ICD-10-CM

## 2021-06-11 PROCEDURE — 74011250637 HC RX REV CODE- 250/637: Performed by: EMERGENCY MEDICINE

## 2021-06-11 PROCEDURE — 90791 PSYCH DIAGNOSTIC EVALUATION: CPT

## 2021-06-11 PROCEDURE — 99284 EMERGENCY DEPT VISIT MOD MDM: CPT

## 2021-06-11 RX ORDER — LORAZEPAM 0.5 MG/1
1 TABLET ORAL
Status: COMPLETED | OUTPATIENT
Start: 2021-06-11 | End: 2021-06-11

## 2021-06-11 RX ADMIN — LORAZEPAM 1 MG: 0.5 TABLET ORAL at 21:33

## 2021-06-11 NOTE — ED TRIAGE NOTES
Pt requesting mental health evaluation for anxiety and behavior changes. Pt reports he is bipolar and recently had his Lithium dosage decreased d/t lab levels. Pt denies SI/HI.

## 2021-06-12 NOTE — BSMART NOTE
Comprehensive Assessment Form Part 1 Section I - Disposition Axis I - ADHD; Bipolar 1 disorder, most recent episode arpit without psychotic symptoms, antidepressant induced Axis II - Borderline Personality Disorder Axis III - Past Medical History:  
Diagnosis Date  ADHD (attention deficit hyperactivity disorder)  Anxiety  Bipolar affective disorder ([address]  Borderline personality disorder ([address]  Depression  Mood disorder ([address]  Substance abuse ([facility] [facility]  Suicidal thoughts Axis IV - Problems related to current unemployment The Medical Doctor to Psychiatrist conference was not completed. The Medical Doctor is in agreement with Psychiatrist disposition because of (reason) pt presents to ED on a VOL basis. The plan is to provide The on-call Psychiatrist consulted was Dr. Dakota Akbar The admitting Psychiatrist will be Dr. Dakota Akbar The admitting Diagnosis is Bipolar 1 disorder, most recent episode arpit without psychotic symptoms, antidepressant induced;  Borderline Personality Disorder The Payor source is: BLUE CROSS MEDICAID/[facility] Section II - Integrated Summary Summary:  Pt is a 30 to [address] who presents to the ED on VOL basis. Pt denies SI/HI/AVH and is oriented x4. Pt reports that he met with his psychiatrist in the past week and his medications were adult Pt reports that he see's Dr. Mike Hay to manage his tyroid condition. Pt reports that he began medication in Feb 2021, but has not had his medications evaluated or adjusted by her since then. Pt reports that in his recent appointment with his psychiatrist, it was noted by Dr. Kavin Wright that his TSH levels were abnormal and in need of evaluation. Pt reports that he has been self-medicating with alcohol and cannabis; and this has only furthered his depressive symptoms. Pt reports that he was denied a job at [employer] due to the fact that he tested positive for [facility] on his UDS.  Pt states that these life factors have increased his depressive symptoms; but he feels that he has the care that he needs in his OP therapist at [facility] and his psychiatrist at [place] [address].  
 
The patient has demonstrated mental capacity to provide informed consent. The information is given by the patient and past medical records. The Chief Complaint is increased depression due to thyroid issues and adjustment in medications. The Precipitant Factors are Hx of ADHD; self-medication with ETOH and Cannabis . Previous Hospitalizations: [facility]; [address]; [address], multiple The patient has not previously been in restraints. Current Psychiatrist and/or  is: Dr. Micheal James, [facility]; [facility] Lethality Assessment: 
 
The potential for suicide noted by the following: not noted. The potential for homicide is not noted. The patient has not been a perpetrator of sexual or physical abuse. There are not pending charges. The patient is not felt to be at risk for self harm or harm to others. The attending nurse was not advised. Section III - Psychosocial 
The patient's overall mood and attitude is calm and cooperative. Feelings of helplessness and hopelessness are not observed. Generalized anxiety is not observed. Panic is not observed. Phobias are not observed. Obsessive compulsive tendencies are not observed. Section IV - Mental Status Exam 
The patient's appearance shows no evidence of impairment. The patient's behavior shows no evidence of impairment. The patient is oriented to time, place, person and situation. The patient's speech shows no evidence of impairment. The patient's mood is euthymic. The range of affect shows no evidence of impairment. The patient's thought content demonstrates no evidence of impairment. The thought process shows no evidence of impairment. The patient's perception shows no evidence of impairment.  The patient's memory shows no evidence of impairment. The patient's appetite shows no evidence of impairment. The patient's sleep has evidence of hypersomnia. The patient's insight shows no evidence of impairment. The patient's judgement is psychologically impaired. Section V - Substance Abuse The patient is using substances. The patient is using alcohol for 1-5 years with last use on 06/11/2021 and cannabis by inhalation for 1-5 years with last use on 06/11/2021. The patient has experienced the following withdrawal symptoms: sleep disturbance. Section VI - Living Arrangements The patient is single. The patient lives with a parent. The patient has no children. The patient does plan to return home upon discharge. The patient does not have legal issues pending. The patient's source of income comes from family. Taoism and cultural practices have not been voiced at this time. The patient's greatest support comes from his psychiatrist and therapist; and this person will be involved with the treatment. The patient has not been in an event described as horrible or outside the realm of ordinary life experience either currently or in the past. 
The patient has not been a victim of sexual/physical abuse. Section VII - Other Areas of Clinical Concern The highest grade achieved is some college with the overall quality of school experience being described as \"it was fine\". The patient is currently unemployed and speaks [place] as a primary language. The patient has no communication impairments affecting communication. The patient's preference for learning can be described as: can read and write adequately. The patient's hearing is normal.  The patient's vision is impaired and  wears glasses or contacts.  
 
Yo Buchanan MA, [place], Licensed Resident in [employer]
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
## 2021-06-18 ENCOUNTER — OFFICE VISIT (OUTPATIENT)
Dept: ENDOCRINOLOGY | Age: 31
End: 2021-06-18
Payer: MEDICAID

## 2021-06-18 VITALS
BODY MASS INDEX: 27.95 KG/M2 | SYSTOLIC BLOOD PRESSURE: 112 MMHG | RESPIRATION RATE: 16 BRPM | HEIGHT: 68 IN | OXYGEN SATURATION: 98 % | DIASTOLIC BLOOD PRESSURE: 68 MMHG | HEART RATE: 63 BPM | WEIGHT: 184.4 LBS

## 2021-06-18 DIAGNOSIS — E03.9 ACQUIRED HYPOTHYROIDISM: ICD-10-CM

## 2021-06-18 DIAGNOSIS — E03.9 ACQUIRED HYPOTHYROIDISM: Primary | ICD-10-CM

## 2021-06-18 PROCEDURE — 99214 OFFICE O/P EST MOD 30 MIN: CPT | Performed by: INTERNAL MEDICINE

## 2021-06-18 RX ORDER — BUPROPION HYDROCHLORIDE 150 MG/1
TABLET, EXTENDED RELEASE ORAL 2 TIMES DAILY
COMMUNITY

## 2021-06-18 NOTE — PROGRESS NOTES
Chief Complaint   Patient presents with    New Patient    Thyroid Problem    Other     [facility] EMERGENCY [facility]     History of Present Illness: Tanya Trujillo is a 26 y.o. male with a past medical hx significant for ADHD, bipolar d/o seen in referral from Misael Rivera,  for discussion related to hypOthyroidism. 04/12/2021:  Notes decreased energy for 9 months- 1 year. Felt like he didn't have any energy. Low libido, whole body didn't feel like himself. Went and had blood levels tested by psychiatry. Saw Miguel and they were abnormal there as well. Had an ultrasound which was without nodules. Has gained weight since this diagnosis. Energy levels are so low he's not going anywhere in life. Has taken bentyl in the past for constipation, uses a stool softener. Endorses a little shakiness in his hands. Main symptom is nervousness, anxiety. Has worked through it- impacting social life- not able to get out as much as he wants to. No recent CT scans. Psychiatrist is Quang Roberts MD.     06/17/2021: Is taking levothyroxine 50mcg- with other meds first thing in the AM. Is not taking calcium/iron/magnesium. There were some issues with lithium creatinine levels and the lithium level is being adjusted. Current Outpatient Medications   Medication Sig    buPROPion SR (Wellbutrin SR) 150 mg SR tablet Take  by mouth two (2) times a day.  levothyroxine (SYNTHROID) 50 mcg tablet Take 1 Tab by mouth Daily (before breakfast).  ARIPiprazole (ABILIFY) 5 mg tablet TAKE 1 TABLET BY MOUTH EVERY DAY    hydrOXYzine pamoate (VISTARIL) 50 mg capsule TAKE 1 CAPSULE BY MOUTH TWICE DAILY AS NEEDED FOR ANXIETY    divalproex ER (DEPAKOTE ER) 500 mg ER tablet Take 3 Tabs by mouth nightly. Indications: arpit associated with bipolar disorder    lithium carbonate 150 mg capsule Take 5 Caps by mouth nightly. Indications: arpit associated with bipolar disorder (Patient taking differently: Take 750 mg by mouth nightly.  4 CAP EVERY NIGHT  Indications: arpit associated with bipolar disorder)     No current facility-administered medications for this visit. no biotin     Social Hx: drives a taxi  Occasional cigar  No alcohol     Review of Systems:  - Constitutional Symptoms: no fevers, chills, weight loss  - Eyes: no blurry vision or double vision  - Cardiovascular: no chest pain or palpitations  - Respiratory: no cough or shortness of breath  - Gastrointestinal: no dysphagia or abdominal pain  - Musculoskeletal: no joint pains or weakness  - Integumentary: no rashes  - Neurological: no numbness, tingling, or headaches  - Psychiatric: endorses anxiety  - Endocrine: no heat or cold intolerance, no polyuria or polydipsia    - Physical Examination:  Visit Vitals  /68   Pulse 63   Resp 16   Ht 5' 8\" (1.727 m)   Wt 184 lb 6.4 oz (83.6 kg)   SpO2 98%   BMI 28.04 kg/m²     - - GENERAL: NCAT, Appears well nourished   - EYES: EOMI, non-icteric, no proptosis   - Ear/Nose/Throat: NCAT, no visible inflammation or masses   - CARDIOVASCULAR: no cyanosis, no visible JVD   - RESPIRATORY: respiratory effort normal without any distress or labored breathing   - MUSCULOSKELETAL: Normal ROM of neck and upper extremities observed   - SKIN: No rash on face  - NEUROLOGIC:  No facial asymmetry (Cranial nerve 7 motor function), No gaze palsy   - PSYCHIATRIC: Normal affect, Normal insight and judgement     Data Reviewed:   FINDINGS:  The thyroid is homogeneous in echotexture with no mass, nodule or other  abnormality.      Bilateral cervical lauren survey was performed, with cine images of the bilateral  neck, from submandibular glands to clavicles. No cervical lymphadenopathy is  shown.        The right lobe measures 6.0 x 1.9 x 2.4 cm and the left lobe measures 5.6 x 1.8  x 1.7 cm. The isthmus measures 0.3 cm.     IMPRESSION  Normal ultrasound examination of the thyroid.     10/2019:  TSH 4.37, Free T3. 1.12, T3 Uptake T3     Results for Moon César (MRN [id number]) as of 6/17/2021 21:39   Ref. Range 4/12/2021 12:48   T4, Free Latest Ref Range: 0.8 - 1.5 NG/DL 0.8   T3, total Latest Ref Range: 71 - 180 ng/dL 89   TSH Latest Ref Range: 0.36 - 3.74 uIU/mL 5.09 (H)         Assessment/Plan: This is a 27 yo gentleman with a past medical hx significant for ADHD and bipolar d/o on lithium seen in follow up for lithium induced hypOthyroidism. We have begun levothyroxine, will now reassess TSH and Free T4 levels. Continue to up-titrate levothyroxine as needed. Discussed importance of taking levothyroxine on an empty stomach and not taking divalent cations within 4 hours (iron/magnesium/calcium).      #lithium induced hypOthyroidism  -reassess TSH/Free T4 on levothyroxine 50mcg  -no evidence of Hashimoto's thyroiditis or Grave's disease at this time    Copy sent to:Daly Thakur DO Annetta Levy, MD    Return to care June 2022    Laney Mendez, [facility] Endocrinology

## 2021-06-23 DIAGNOSIS — E03.9 ACQUIRED HYPOTHYROIDISM: Primary | ICD-10-CM

## 2021-06-23 LAB
T4 FREE SERPL-MCNC: 1.13 NG/DL (ref 0.82–1.77)
TSH SERPL DL<=0.005 MIU/L-ACNC: 1.93 UIU/ML (ref 0.45–4.5)

## 2021-06-23 RX ORDER — LEVOTHYROXINE SODIUM 50 UG/1
50 TABLET ORAL
Qty: 90 TABLET | Refills: 3 | Status: SHIPPED | OUTPATIENT
Start: 2021-06-23 | End: 2022-04-01

## 2021-06-23 NOTE — LETTER
6/23/2021    [facility] [id number]      Dear Sofia Persons:    Please find your most recent results below. Resulted Orders   TSH 3RD GENERATION   Result Value Ref Range    TSH 1.930 0.450 - 4.500 uIU/mL    Narrative    Performed at:  [address]  [id number]  : Alek Hutson MD, Phone:  [phone]   T4, FREE   Result Value Ref Range    T4, Free 1.13 0.82 - 1.77 ng/dL    Narrative    Performed at:  [address]  [id number]  : Alek Hutson MD, Phone:  [phone]       RECOMMENDATIONS:    Zoë Irwin,     Your thyroid function tests have normalized with 50mcg of levothyroxine. Continue this dose; we will reassess labs in March 2022. I have ordered the labs for [facility].      Please call me if you have any questions: [phone]    Sincerely,      Puja Velazquez MD

## 2022-03-01 DIAGNOSIS — E03.9 ACQUIRED HYPOTHYROIDISM: ICD-10-CM

## 2022-03-18 PROBLEM — F31.9 BIPOLAR DISORDER (HCC): Status: ACTIVE | Noted: 2020-10-06

## 2022-04-01 RX ORDER — LEVOTHYROXINE SODIUM 50 UG/1
TABLET ORAL
Qty: 90 TABLET | Refills: 3 | Status: SHIPPED | OUTPATIENT
Start: 2022-04-01 | End: 2022-04-04 | Stop reason: SDUPTHER

## 2022-04-04 RX ORDER — LEVOTHYROXINE SODIUM 50 UG/1
50 TABLET ORAL
Qty: 90 TABLET | Refills: 3 | Status: SHIPPED | OUTPATIENT
Start: 2022-04-04

## 2022-12-15 ENCOUNTER — HOSPITAL ENCOUNTER (EMERGENCY)
Age: 32
Discharge: HOME | End: 2022-12-16
Attending: STUDENT IN AN ORGANIZED HEALTH CARE EDUCATION/TRAINING PROGRAM
Payer: MEDICAID

## 2022-12-15 VITALS
WEIGHT: 161.16 LBS | DIASTOLIC BLOOD PRESSURE: 104 MMHG | RESPIRATION RATE: 16 BRPM | HEART RATE: 66 BPM | OXYGEN SATURATION: 100 % | TEMPERATURE: 98.2 F | SYSTOLIC BLOOD PRESSURE: 151 MMHG | BODY MASS INDEX: 24.5 KG/M2

## 2022-12-15 DIAGNOSIS — R78.89 ABNORMAL LITHIUM LEVEL IN BLOOD: ICD-10-CM

## 2022-12-15 DIAGNOSIS — N28.9 ACUTE RENAL INSUFFICIENCY: ICD-10-CM

## 2022-12-15 DIAGNOSIS — F41.1 ANXIETY STATE: Primary | ICD-10-CM

## 2022-12-15 LAB
ALBUMIN SERPL-MCNC: 4.5 G/DL (ref 3.5–5)
ALBUMIN/GLOB SERPL: 1.2 {RATIO} (ref 1.1–2.2)
ALP SERPL-CCNC: 50 U/L (ref 45–117)
ALT SERPL-CCNC: 16 U/L (ref 12–78)
ANION GAP SERPL CALC-SCNC: 9 MMOL/L (ref 5–15)
AST SERPL-CCNC: 27 U/L (ref 15–37)
BILIRUB SERPL-MCNC: 0.5 MG/DL (ref 0.2–1)
BUN SERPL-MCNC: 15 MG/DL (ref 6–20)
BUN/CREAT SERPL: 11 (ref 12–20)
CALCIUM SERPL-MCNC: 9.7 MG/DL (ref 8.5–10.1)
CHLORIDE SERPL-SCNC: 102 MMOL/L (ref 97–108)
CO2 SERPL-SCNC: 27 MMOL/L (ref 21–32)
CREAT SERPL-MCNC: 1.39 MG/DL (ref 0.7–1.3)
DATE LAST DOSE: ABNORMAL
GLOBULIN SER CALC-MCNC: 3.8 G/DL (ref 2–4)
GLUCOSE SERPL-MCNC: 87 MG/DL (ref 65–100)
LITHIUM SERPL-SCNC: 1.7 MMOL/L (ref 0.6–1.2)
POTASSIUM SERPL-SCNC: 3.6 MMOL/L (ref 3.5–5.1)
PROT SERPL-MCNC: 8.3 G/DL (ref 6.4–8.2)
REPORTED DOSE,DOSE: ABNORMAL UNITS
REPORTED DOSE/TIME,TMG: ABNORMAL
SODIUM SERPL-SCNC: 138 MMOL/L (ref 136–145)

## 2022-12-15 PROCEDURE — 99284 EMERGENCY DEPT VISIT MOD MDM: CPT

## 2022-12-15 PROCEDURE — 36415 COLL VENOUS BLD VENIPUNCTURE: CPT

## 2022-12-15 PROCEDURE — 80164 ASSAY DIPROPYLACETIC ACD TOT: CPT

## 2022-12-15 PROCEDURE — 80053 COMPREHEN METABOLIC PANEL: CPT

## 2022-12-15 PROCEDURE — 80178 ASSAY OF LITHIUM: CPT

## 2022-12-15 RX ORDER — HYDROXYZINE 25 MG/1
50 TABLET, FILM COATED ORAL ONCE
Status: COMPLETED | OUTPATIENT
Start: 2022-12-15 | End: 2022-12-16

## 2022-12-15 RX ORDER — BUPROPION HYDROCHLORIDE 150 MG/1
150 TABLET, EXTENDED RELEASE ORAL 2 TIMES DAILY
Status: DISCONTINUED | OUTPATIENT
Start: 2022-12-15 | End: 2022-12-15

## 2022-12-16 LAB
ATRIAL RATE: 48 BPM
CALCULATED P AXIS, ECG09: 27 DEGREES
CALCULATED R AXIS, ECG10: 60 DEGREES
CALCULATED T AXIS, ECG11: 23 DEGREES
DIAGNOSIS, 93000: NORMAL
P-R INTERVAL, ECG05: 182 MS
Q-T INTERVAL, ECG07: 508 MS
QRS DURATION, ECG06: 116 MS
QTC CALCULATION (BEZET), ECG08: 453 MS
VALPROATE SERPL-MCNC: 100 UG/ML (ref 50–100)
VENTRICULAR RATE, ECG03: 48 BPM

## 2022-12-16 PROCEDURE — 93005 ELECTROCARDIOGRAM TRACING: CPT

## 2022-12-16 PROCEDURE — 74011250637 HC RX REV CODE- 250/637: Performed by: STUDENT IN AN ORGANIZED HEALTH CARE EDUCATION/TRAINING PROGRAM

## 2022-12-16 RX ORDER — QUETIAPINE FUMARATE 25 MG/1
25 TABLET, FILM COATED ORAL
Qty: 60 TABLET | Refills: 1 | Status: SHIPPED | OUTPATIENT
Start: 2022-12-16

## 2022-12-16 RX ORDER — BUPROPION HYDROCHLORIDE 100 MG/1
50 TABLET ORAL 2 TIMES DAILY
Qty: 30 TABLET | Refills: 1 | Status: SHIPPED | OUTPATIENT
Start: 2022-12-16 | End: 2023-01-15

## 2022-12-16 RX ADMIN — HYDROXYZINE HYDROCHLORIDE 50 MG: 25 TABLET, FILM COATED ORAL at 01:09
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
## 2022-12-16 NOTE — ED NOTES
Pt discharged to home in [place], states understanding of dc instructions and followup, rx x 2 sent  Pt given handout on [facility] resources

## 2022-12-16 NOTE — ED TRIAGE NOTES
Pt complaining of having psychiatric issues at baseline, but has had an incident happen today that has sent him into a depression. Denies SI/HI.

## 2023-01-28 ENCOUNTER — HOSPITAL ENCOUNTER (EMERGENCY)
Age: 33
Discharge: HOME | End: 2023-01-28
Attending: STUDENT IN AN ORGANIZED HEALTH CARE EDUCATION/TRAINING PROGRAM
Payer: MEDICAID

## 2023-01-28 VITALS
SYSTOLIC BLOOD PRESSURE: 137 MMHG | OXYGEN SATURATION: 99 % | WEIGHT: 158 LBS | BODY MASS INDEX: 24.8 KG/M2 | DIASTOLIC BLOOD PRESSURE: 82 MMHG | RESPIRATION RATE: 16 BRPM | TEMPERATURE: 98.2 F | HEART RATE: 73 BPM | HEIGHT: 67 IN

## 2023-01-28 DIAGNOSIS — S39.012A LUMBOSACRAL STRAIN, INITIAL ENCOUNTER: Primary | ICD-10-CM

## 2023-01-28 PROCEDURE — 99284 EMERGENCY DEPT VISIT MOD MDM: CPT

## 2023-01-28 PROCEDURE — 74011250636 HC RX REV CODE- 250/636: Performed by: STUDENT IN AN ORGANIZED HEALTH CARE EDUCATION/TRAINING PROGRAM

## 2023-01-28 PROCEDURE — 74011250637 HC RX REV CODE- 250/637: Performed by: STUDENT IN AN ORGANIZED HEALTH CARE EDUCATION/TRAINING PROGRAM

## 2023-01-28 PROCEDURE — 96372 THER/PROPH/DIAG INJ SC/IM: CPT

## 2023-01-28 PROCEDURE — 74011000250 HC RX REV CODE- 250: Performed by: STUDENT IN AN ORGANIZED HEALTH CARE EDUCATION/TRAINING PROGRAM

## 2023-01-28 RX ORDER — LIDOCAINE 4 G/100G
1 PATCH TOPICAL
Status: DISCONTINUED | OUTPATIENT
Start: 2023-01-28 | End: 2023-01-28 | Stop reason: HOSPADM

## 2023-01-28 RX ORDER — KETOROLAC TROMETHAMINE 30 MG/ML
30 INJECTION, SOLUTION INTRAMUSCULAR; INTRAVENOUS ONCE
Status: COMPLETED | OUTPATIENT
Start: 2023-01-28 | End: 2023-01-28

## 2023-01-28 RX ORDER — LIDOCAINE 50 MG/G
PATCH TOPICAL
Qty: 5 EACH | Refills: 0 | Status: SHIPPED | OUTPATIENT
Start: 2023-01-28

## 2023-01-28 RX ORDER — ACETAMINOPHEN 500 MG
1000 TABLET ORAL ONCE
Status: COMPLETED | OUTPATIENT
Start: 2023-01-28 | End: 2023-01-28

## 2023-01-28 RX ORDER — CYCLOBENZAPRINE HCL 5 MG
5 TABLET ORAL
Qty: 10 TABLET | Refills: 0 | Status: SHIPPED | OUTPATIENT
Start: 2023-01-28

## 2023-01-28 RX ADMIN — ACETAMINOPHEN 1000 MG: 500 TABLET, FILM COATED ORAL at 11:30

## 2023-01-28 RX ADMIN — KETOROLAC TROMETHAMINE 30 MG: 30 INJECTION, SOLUTION INTRAMUSCULAR; INTRAVENOUS at 11:30

## 2023-01-28 NOTE — ED PROVIDER NOTES
HPI     Date of Service:  2023    Patient:  Caesar Nj    Chief Complaint:  Back Pain       HPI:  Caesar Nj is a 27 y.o.  male with a past medical history of bipolar who presents for evaluation of low back pain. Patient notes while at work yesterday, he was moving a heavy item and subsequently developed low back pain. Pain has been persistent since onset. He has not taken any medications for his symptoms. Pain is located diffusely throughout the low back, denies midline pain. No falls or injuries. No radiation of pain down the legs. No associated leg weakness, leg tingling/numbness, saddle anesthesia, bladder or bowel retention or incontinence. No associated dysuria or hematuria. He denies IVDA. No fevers. No other recent illness.       Past Medical History:   Diagnosis Date    ADHD (attention deficit hyperactivity disorder)     Anxiety     Bipolar affective disorder (HCC)     Borderline personality disorder (HCC)     Depression     Mood disorder (HCC)     Substance abuse ([facility] [address]     Suicidal thoughts        Past Surgical History:   Procedure Laterality Date    HX WISDOM TEETH EXTRACTION           Family History:   Problem Relation Age of Onset    Arthritis-rheumatoid Maternal Grandmother     Parkinsonism Maternal Grandmother     Diabetes Maternal Grandfather     Other Paternal Grandmother         brain tumor    Pacemaker Paternal Grandfather     No Known Problems Mother     No Known Problems Father     Heart Disease Brother 25        Congestive Heart Failure  - Sudden onset    No Known Problems Brother        Social History     Socioeconomic History    Marital status: SINGLE     Spouse name: Not on file    Number of children: Not on file    Years of education: Not on file    Highest education level: Not on file   Occupational History    Not on file   Tobacco Use    Smoking status: Some Days     Packs/day: 0.50     Types: Cigarettes     Last attempt to quit: 2016     Years since quittin.6 Smokeless tobacco: Never    Tobacco comments:     \"smokes cigars\"   Vaping Use    Vaping Use: Never used   Substance and Sexual Activity    Alcohol use: Yes     Alcohol/week: 1.0 standard drink     Types: 1 Cans of beer per week    Drug use: No    Sexual activity: Not Currently   Other Topics Concern     Service Not Asked    Blood Transfusions Not Asked    Caffeine Concern Not Asked    Occupational Exposure Not Asked    Hobby Hazards Not Asked    Sleep Concern Not Asked    Stress Concern Not Asked    Weight Concern Not Asked    Special Diet Not Asked    Back Care Not Asked    Exercise Not Asked    Bike Helmet Not Asked    Seat Belt Not Asked    Self-Exams Not Asked   Social History Narrative    Not on file     Social Determinants of Health     Financial Resource Strain: Not on file   Food Insecurity: Not on file   Transportation Needs: Not on file   Physical Activity: Not on file   Stress: Not on file   Social Connections: Not on file   Intimate Partner Violence: Not on file   Housing Stability: Not on file         ALLERGIES: Patient has no known allergies. Review of Systems   Constitutional:  Negative for chills and fever. HENT:  Negative for congestion and rhinorrhea. Eyes:  Negative for discharge and redness. Respiratory:  Negative for cough. Gastrointestinal:  Negative for abdominal pain, diarrhea, nausea and vomiting. Genitourinary:  Negative for dysuria and hematuria. Musculoskeletal:  Positive for back pain. Skin:  Negative for rash. Neurological:  Negative for speech difficulty, weakness and numbness. Psychiatric/Behavioral:  Negative for agitation and confusion. Vitals:    01/28/23 1010   BP: 137/82   Pulse: 73   Resp: 16   Temp: 98.2 °F (36.8 °C)   SpO2: 99%   Weight: 71.7 kg (158 lb)   Height: 5' 7\" (1.702 m)            Physical Exam  Vitals and nursing note reviewed. Constitutional:       General: He is not in acute distress. Appearance: Normal appearance.  He is not ill-appearing or toxic-appearing. HENT:      Head: Normocephalic and atraumatic. Eyes:      General: No scleral icterus. Conjunctiva/sclera: Conjunctivae normal.   Cardiovascular:      Rate and Rhythm: Normal rate. Pulses: Normal pulses. Pulmonary:      Effort: Pulmonary effort is normal. No respiratory distress. Abdominal:      General: Abdomen is flat. Palpations: Abdomen is soft. Musculoskeletal:      Thoracic back: No bony tenderness. Lumbar back: No bony tenderness. Positive right straight leg raise test and positive left straight leg raise test.      Right lower leg: No edema. Left lower leg: No edema. Comments: +lumbar paraspinal muscle tenderness    Skin:     General: Skin is warm and dry. Capillary Refill: Capillary refill takes less than 2 seconds. Neurological:      General: No focal deficit present. Mental Status: He is alert and oriented to person, place, and time. Psychiatric:         Mood and Affect: Mood normal.         Behavior: Behavior normal.        Medical Decision Making      DECISION MAKING:  Merry Mayes is a 27 y.o. male who comes in as above. On arrival to the emergency department patient is afebrile and vital signs are stable. On my examination he is well-appearing, no acute distress. On examination of the back, there is no midline tenderness. He does have some bilateral lumbar paraspinal muscle pain. There is pain with bilateral straight leg raise, but no weakness of the lower extremities or paresthesias. No red flag signs or symptoms concerning for fracture, acute cord process including cauda equina, conus medullaris, epidural abscess or hematoma. Patient be treated with Tylenol, Toradol and lidocaine patch and reevaluated. On reevaluation he is having improvement of his symptoms.   He was encouraged on supportive measures at home for his symptoms with alternating Tylenol and ibuprofen, use of Flexeril which a prescription will be provided, lidocaine patches also providing a prescription for and stretches. Follow-up needs and return precautions were discussed. He verbalized understanding will be discharged home. Risk  OTC drugs. Prescription drug management. Procedures    LABS:  No results found for this or any previous visit (from the past 6 hour(s)). IMAGING:  No orders to display         Medications During Visit:  Medications   lidocaine 4 % patch 1 Patch (1 Patch TransDERmal Apply Patch 1/28/23 1130)   acetaminophen (TYLENOL) tablet 1,000 mg (1,000 mg Oral Given 1/28/23 1130)   ketorolac (TORADOL) injection 30 mg (30 mg IntraMUSCular Given 1/28/23 1130)         IMPRESSION:  1. Lumbosacral strain, initial encounter        DISPOSITION:  Discharged      Current Discharge Medication List        START taking these medications    Details   cyclobenzaprine (FLEXERIL) 5 mg tablet Take 1 Tablet by mouth three (3) times daily as needed for Muscle Spasm(s). Qty: 10 Tablet, Refills: 0  Start date: 1/28/2023      lidocaine (Lidoderm) 5 % Apply patch to the affected area for 12 hours a day and remove for 12 hours a day. Qty: 5 Each, Refills: 0  Start date: 1/28/2023              Follow-up Information       Follow up With Specialties Details Why Contact Info    [address] [place] [address], [facility]  Family Medicine Schedule an appointment as soon as possible for a visit   [facility] [address]   [phone]      [address] Emergency Medicine  If symptoms worsen [facility] [address] [place] [address] [id number]              The patient is asked to follow-up with their primary care provider in the next several days. They are to call tomorrow for an appointment. The patient is asked to return promptly for any increased concerns or worsening of symptoms. They can return to this emergency department or any other emergency department.       Adolph Ivory DO

## 2023-01-28 NOTE — DISCHARGE INSTRUCTIONS
Alternate Tylenol (650mg) and ibuprofen (600mg) every 4 hours for pain control. Use muscle relaxant as prescribed. Perform gentle stretches as noted in paperwork.

## 2023-01-28 NOTE — ED NOTES
Pt feeling better, still has some pain. Pt given d/c instructions and aware of rx called in to pharmacy.   Pt declined w/c and left ambulatory in care of self in stable condition

## 2023-01-28 NOTE — Clinical Note
[facility] EMERGENCY CTR  [address]  [address]  [phone]    Work/School Note    Date: 1/28/2023    To Whom It May concern:    Gene Woodard was seen and treated today in the emergency room by the following provider(s):  Attending Provider: Tammy Vernon DO. Gene Woodard is excused from work/school on 01/28/23 and 01/29/23. He is medically clear to return to work/school on 1/30/2023.        Sincerely,          Emy Anaya DO

## 2023-03-03 ENCOUNTER — HOSPITAL ENCOUNTER (EMERGENCY)
Age: 33
Discharge: HOME | End: 2023-03-03
Attending: STUDENT IN AN ORGANIZED HEALTH CARE EDUCATION/TRAINING PROGRAM
Payer: MEDICAID

## 2023-03-03 VITALS
SYSTOLIC BLOOD PRESSURE: 145 MMHG | HEART RATE: 66 BPM | WEIGHT: 158 LBS | OXYGEN SATURATION: 99 % | DIASTOLIC BLOOD PRESSURE: 89 MMHG | RESPIRATION RATE: 16 BRPM | HEIGHT: 67 IN | BODY MASS INDEX: 24.8 KG/M2 | TEMPERATURE: 97 F

## 2023-03-03 DIAGNOSIS — Z86.59 HISTORY OF BIPOLAR DISORDER: ICD-10-CM

## 2023-03-03 DIAGNOSIS — Z76.0 MEDICATION REFILL: Primary | ICD-10-CM

## 2023-03-03 PROCEDURE — 99283 EMERGENCY DEPT VISIT LOW MDM: CPT

## 2023-03-03 RX ORDER — BUPROPION HYDROCHLORIDE 150 MG/1
TABLET, EXTENDED RELEASE ORAL DAILY
COMMUNITY
End: 2023-03-03 | Stop reason: SDUPTHER

## 2023-03-03 RX ORDER — LITHIUM CARBONATE 150 MG/1
600 CAPSULE ORAL
Qty: 56 CAPSULE | Refills: 0 | Status: SHIPPED | OUTPATIENT
Start: 2023-03-03 | End: 2023-03-17

## 2023-03-03 RX ORDER — DIVALPROEX SODIUM 500 MG/1
1500 TABLET, EXTENDED RELEASE ORAL
Qty: 42 TABLET | Refills: 0 | Status: SHIPPED | OUTPATIENT
Start: 2023-03-03 | End: 2023-03-17

## 2023-03-03 RX ORDER — LITHIUM CARBONATE 150 MG/1
750 CAPSULE ORAL
Qty: 70 CAPSULE | Refills: 0 | Status: SHIPPED | OUTPATIENT
Start: 2023-03-03 | End: 2023-03-03 | Stop reason: SDUPTHER

## 2023-03-03 RX ORDER — BUPROPION HYDROCHLORIDE 150 MG/1
150 TABLET, EXTENDED RELEASE ORAL DAILY
Qty: 14 TABLET | Refills: 0 | Status: SHIPPED | OUTPATIENT
Start: 2023-03-03 | End: 2023-03-04 | Stop reason: SDUPTHER

## 2023-03-04 RX ORDER — BUPROPION HYDROCHLORIDE 150 MG/1
150 TABLET, EXTENDED RELEASE ORAL DAILY
Qty: 14 TABLET | Refills: 0 | Status: SHIPPED | OUTPATIENT
Start: 2023-03-04 | End: 2023-03-04 | Stop reason: ALTCHOICE

## 2023-03-04 RX ORDER — BUPROPION HYDROCHLORIDE 150 MG/1
150 TABLET ORAL DAILY
Qty: 30 TABLET | Refills: 0 | Status: SHIPPED | OUTPATIENT
Start: 2023-03-04

## 2023-03-04 NOTE — DISCHARGE INSTRUCTIONS
We are providing you with a list of local clinics to set up follow up appointments, please call Monday   You can also attempt to get mental health services through [facility] services (see number/ address provided). They often offer same day walk in appointments. We are also giving you a list of primary care clinics. Return to the ER for any worsening or worrisome symptoms.

## 2023-03-04 NOTE — ED PROVIDER NOTES
HPI     Niyah Munoz is a 27 y.o. male with Hx of ADHD, anxiety, bipolar, borderline personality d/o, depression, substance abuse who presents ambulatory by himself to [facility] ED with cc of medication refill. Patient reports that he has bipolar disorder and he is very concerned because he ran out of his psychiatric medications as of tonight. He states that he tried to get refills from his psychiatrist, however, they would no longer see him because there was some sort of \"billing issue. \"  Patient is very concerned because he is currently unemployed, doing [employer], to make money and is also recently had to leave his home and is currently living in a hotel. He is requesting a refill of his lithium, Wellbutrin,Depakote. He states that he has been taking his medications as directed brings his pill bottles to the emergency department for validation. Denies SI/ HI. Denies F/C, N/V/D, urinary or bowel habit concerns. Does not feel that he needs an emergency psychiatric consultation. Denies tobacco, alcohol, substance abuse. PCP: None    There are no other complaints, changes or physical findings at this time.         Past Medical History:   Diagnosis Date    ADHD (attention deficit hyperactivity disorder)     Anxiety     Bipolar affective disorder (HCC)     Borderline personality disorder (HCC)     Depression     Mood disorder (HCC)     Substance abuse ([facility] [address]     Suicidal thoughts        Past Surgical History:   Procedure Laterality Date    HX WISDOM TEETH EXTRACTION           Family History:   Problem Relation Age of Onset    Arthritis-rheumatoid Maternal Grandmother     Parkinsonism Maternal Grandmother     Diabetes Maternal Grandfather     Other Paternal Grandmother         brain tumor    Pacemaker Paternal Grandfather     No Known Problems Mother     No Known Problems Father     Heart Disease Brother 25        Congestive Heart Failure  - Sudden onset    No Known Problems Brother        Social History Socioeconomic History    Marital status: SINGLE     Spouse name: Not on file    Number of children: Not on file    Years of education: Not on file    Highest education level: Not on file   Occupational History    Not on file   Tobacco Use    Smoking status: Some Days     Packs/day: 0.50     Types: Cigarettes     Last attempt to quit: 2016     Years since quittin.7    Smokeless tobacco: Never    Tobacco comments:     \"smokes cigars\"   Vaping Use    Vaping Use: Never used   Substance and Sexual Activity    Alcohol use: Yes     Alcohol/week: 1.0 standard drink     Types: 1 Cans of beer per week    Drug use: No    Sexual activity: Not Currently   Other Topics Concern     Service Not Asked    Blood Transfusions Not Asked    Caffeine Concern Not Asked    Occupational Exposure Not Asked    Hobby Hazards Not Asked    Sleep Concern Not Asked    Stress Concern Not Asked    Weight Concern Not Asked    Special Diet Not Asked    Back Care Not Asked    Exercise Not Asked    Bike Helmet Not Asked    Seat Belt Not Asked    Self-Exams Not Asked   Social History Narrative    Not on file     Social Determinants of Health     Financial Resource Strain: Not on file   Food Insecurity: Not on file   Transportation Needs: Not on file   Physical Activity: Not on file   Stress: Not on file   Social Connections: Not on file   Intimate Partner Violence: Not on file   Housing Stability: Not on file         ALLERGIES: Patient has no known allergies. Review of Systems   Constitutional:  Negative for activity change, appetite change, chills and fever. HENT:  Negative for congestion, rhinorrhea and sore throat. Eyes:  Negative for pain, discharge and visual disturbance. Respiratory:  Negative for cough and shortness of breath. Cardiovascular:  Negative for chest pain. Gastrointestinal:  Negative for abdominal pain, diarrhea, nausea and vomiting.    Genitourinary:  Negative for dysuria, flank pain, frequency and urgency. Musculoskeletal:  Negative for joint swelling and neck pain. Skin:  Negative for color change and rash. Neurological:  Negative for dizziness, speech difficulty, weakness and headaches. Psychiatric/Behavioral:  Negative for behavioral problems, dysphoric mood and sleep disturbance. The patient is nervous/anxious. All other systems reviewed and are negative. Vitals:    03/03/23 1926   BP: (!) 145/89   Pulse: 66   Resp: 16   Temp: 97 °F (36.1 °C)   SpO2: 99%   Weight: 71.7 kg (158 lb)   Height: 5' 7\" (1.702 m)            Physical Exam  Vitals and nursing note reviewed. Constitutional:       General: He is not in acute distress. Appearance: He is well-developed. HENT:      Head: Normocephalic and atraumatic. Right Ear: External ear normal.      Left Ear: External ear normal.   Eyes:      Conjunctiva/sclera: Conjunctivae normal.      Pupils: Pupils are equal, round, and reactive to light. Cardiovascular:      Rate and Rhythm: Normal rate and regular rhythm. Heart sounds: Normal heart sounds. Pulmonary:      Effort: Pulmonary effort is normal.      Breath sounds: Normal breath sounds. Abdominal:      Palpations: Abdomen is soft. Musculoskeletal:         General: Normal range of motion. Cervical back: Normal range of motion and neck supple. Skin:     General: Skin is warm and dry. Neurological:      Mental Status: He is alert and oriented to person, place, and time. Psychiatric:         Attention and Perception: Attention and perception normal.         Mood and Affect: Mood is anxious. Behavior: Behavior normal.         Thought Content:  Thought content normal.         Judgment: Judgment normal.        Medical Decision Making  Differential diagnoses include bipolar, mood disorder, depression, anxiety, medication refill    Patient presents to the emergency department with concern for wanting his medications to be refilled for management of his mental health. He is not suicidal or homicidal and does not feel that he needs emergent psychiatric consultation or inpatient psychiatric admission at this time. He does present with any CNS or GI symptoms to expect toxicity of these medications. He is very concerned about getting outpatient psychiatry follow-up and was given the name for the local [facility] to help arrange follow-up. I have verified his medications and arranged for a 14-day refill for him. His prescriptions all state they were to  in . Patient was encouraged to follow-up with local mental health agency for ongoing management of his concerns. Reasons to return to the ER were provided and reviewed. Risk  Prescription drug management. Procedures    LABORATORY TESTS:  No results found for this or any previous visit (from the past 12 hour(s)). IMAGING RESULTS:  No orders to display       MEDICATIONS GIVEN:  Medications - No data to display    IMPRESSION:  1. Medication refill    2. History of bipolar disorder        PLAN:  1. Discharge Medication List as of 3/3/2023  7:38 PM        CONTINUE these medications which have CHANGED    Details   divalproex ER (DEPAKOTE ER) 500 mg ER tablet Take 3 Tablets by mouth nightly for 14 days. Indications: arpit associated with bipolar disorder, Print, Disp-42 Tablet, R-0      buPROPion SR (WELLBUTRIN SR) 150 mg SR tablet Take 1 Tablet by mouth daily for 14 days. , Print, Disp-14 Tablet, R-0      lithium carbonate 150 mg capsule Take 5 Capsules by mouth nightly for 14 days. 4 CAP EVERY NIGHT  Indications: arpit associated with bipolar disorder, Print, Disp-70 Capsule, R-0           CONTINUE these medications which have NOT CHANGED    Details   cyclobenzaprine (FLEXERIL) 5 mg tablet Take 1 Tablet by mouth three (3) times daily as needed for Muscle Spasm(s). , Normal, Disp-10 Tablet, R-0      lidocaine (Lidoderm) 5 % Apply patch to the affected area for 12 hours a day and remove for 12 hours a day., Normal, Disp-5 Each, R-0      QUEtiapine (SEROqueL) 25 mg tablet Take 1 Tablet by mouth nightly., Normal, Disp-60 Tablet, R-1      levothyroxine (SYNTHROID) 50 mcg tablet Take 1 Tablet by mouth Daily (before breakfast). , Normal, Disp-90 Tablet, R-3      hydrOXYzine pamoate (VISTARIL) 50 mg capsule TAKE 1 CAPSULE BY MOUTH TWICE DAILY AS NEEDED FOR ANXIETY, Historical Med           2. Follow-up Information       Follow up With Specialties Details Why Contact Info    [address] [place] [address], [facility],  Family Medicine Schedule an appointment as soon as possible for a visit   [address]  [address] [place] [address]   [phone]      [facility] EMERGENCY [place] [address] Emergency Medicine Go to  As needed, If symptoms worsen [id number] [phone]  Schedule an appointment as soon as possible for a visit   [address]elle [address]  [phone]          3. Return to ED if worse     Please note that this dictation was completed with [employer] the computer voice recognition software. Quite often unanticipated grammatical, syntax, homophones, and other interpretive errors are inadvertently transcribed by the computer software. Please disregard these errors. Please excuse any errors that have escaped final proofreading.

## 2023-03-18 ENCOUNTER — HOSPITAL ENCOUNTER (EMERGENCY)
Age: 33
Discharge: HOME | End: 2023-03-18
Attending: EMERGENCY MEDICINE
Payer: MEDICAID

## 2023-03-18 VITALS
SYSTOLIC BLOOD PRESSURE: 126 MMHG | HEART RATE: 55 BPM | RESPIRATION RATE: 18 BRPM | DIASTOLIC BLOOD PRESSURE: 91 MMHG | TEMPERATURE: 98 F | OXYGEN SATURATION: 100 %

## 2023-03-18 DIAGNOSIS — Z76.0 MEDICATION REFILL: Primary | ICD-10-CM

## 2023-03-18 LAB
ALBUMIN SERPL-MCNC: 3.8 G/DL (ref 3.5–5)
ALBUMIN/GLOB SERPL: 1.2 (ref 1.1–2.2)
ALP SERPL-CCNC: 53 U/L (ref 45–117)
ALT SERPL-CCNC: 23 U/L (ref 12–78)
ANION GAP SERPL CALC-SCNC: 8 MMOL/L (ref 5–15)
AST SERPL-CCNC: 25 U/L (ref 15–37)
BASOPHILS # BLD: 0 K/UL (ref 0–0.1)
BASOPHILS NFR BLD: 1 % (ref 0–1)
BILIRUB SERPL-MCNC: 0.3 MG/DL (ref 0.2–1)
BUN SERPL-MCNC: 16 MG/DL (ref 6–20)
BUN/CREAT SERPL: 15 (ref 12–20)
CALCIUM SERPL-MCNC: 8.7 MG/DL (ref 8.5–10.1)
CHLORIDE SERPL-SCNC: 104 MMOL/L (ref 97–108)
CO2 SERPL-SCNC: 28 MMOL/L (ref 21–32)
CREAT SERPL-MCNC: 1.07 MG/DL (ref 0.7–1.3)
DIFFERENTIAL METHOD BLD: ABNORMAL
EOSINOPHIL # BLD: 0.3 K/UL (ref 0–0.4)
EOSINOPHIL NFR BLD: 4 % (ref 0–7)
ERYTHROCYTE [DISTWIDTH] IN BLOOD BY AUTOMATED COUNT: 12.3 % (ref 11.5–14.5)
GLOBULIN SER CALC-MCNC: 3.3 G/DL (ref 2–4)
GLUCOSE SERPL-MCNC: 104 MG/DL (ref 65–100)
HCT VFR BLD AUTO: 37 % (ref 36.6–50.3)
HGB BLD-MCNC: 12.1 G/DL (ref 12.1–17)
IMM GRANULOCYTES # BLD AUTO: 0 K/UL (ref 0–0.04)
IMM GRANULOCYTES NFR BLD AUTO: 0 % (ref 0–0.5)
LITHIUM SERPL-SCNC: 0.91 MMOL/L (ref 0.6–1.2)
LYMPHOCYTES # BLD: 3.3 K/UL (ref 0.8–3.5)
LYMPHOCYTES NFR BLD: 51 % (ref 12–49)
MCH RBC QN AUTO: 31.3 PG (ref 26–34)
MCHC RBC AUTO-ENTMCNC: 32.7 G/DL (ref 30–36.5)
MCV RBC AUTO: 95.9 FL (ref 80–99)
MONOCYTES # BLD: 0.4 K/UL (ref 0–1)
MONOCYTES NFR BLD: 7 % (ref 5–13)
NEUTS SEG # BLD: 2.4 K/UL (ref 1.8–8)
NEUTS SEG NFR BLD: 37 % (ref 32–75)
NRBC # BLD: 0 K/UL (ref 0–0.01)
NRBC BLD-RTO: 0 PER 100 WBC
PLATELET # BLD AUTO: 182 K/UL (ref 150–400)
PMV BLD AUTO: 10.8 FL (ref 8.9–12.9)
POTASSIUM SERPL-SCNC: 3.8 MMOL/L (ref 3.5–5.1)
PROT SERPL-MCNC: 7.1 G/DL (ref 6.4–8.2)
RBC # BLD AUTO: 3.86 M/UL (ref 4.1–5.7)
SODIUM SERPL-SCNC: 140 MMOL/L (ref 136–145)
VALPROATE SERPL-MCNC: 72 UG/ML (ref 50–100)
WBC # BLD AUTO: 6.4 K/UL (ref 4.1–11.1)

## 2023-03-18 PROCEDURE — 90791 PSYCH DIAGNOSTIC EVALUATION: CPT

## 2023-03-18 PROCEDURE — 80178 ASSAY OF LITHIUM: CPT

## 2023-03-18 PROCEDURE — 36415 COLL VENOUS BLD VENIPUNCTURE: CPT

## 2023-03-18 PROCEDURE — 80053 COMPREHEN METABOLIC PANEL: CPT

## 2023-03-18 PROCEDURE — 80164 ASSAY DIPROPYLACETIC ACD TOT: CPT

## 2023-03-18 PROCEDURE — 99283 EMERGENCY DEPT VISIT LOW MDM: CPT

## 2023-03-18 PROCEDURE — 85025 COMPLETE CBC W/AUTO DIFF WBC: CPT

## 2023-03-18 RX ORDER — LITHIUM CARBONATE 150 MG/1
600 CAPSULE ORAL
Qty: 56 CAPSULE | Refills: 0 | Status: SHIPPED | OUTPATIENT
Start: 2023-03-18 | End: 2023-04-01

## 2023-03-18 RX ORDER — DIVALPROEX SODIUM 500 MG/1
1500 TABLET, EXTENDED RELEASE ORAL
Qty: 42 TABLET | Refills: 0 | Status: SHIPPED | OUTPATIENT
Start: 2023-03-18 | End: 2023-04-01

## 2023-03-18 NOTE — ED TRIAGE NOTES
Pt arrives for medication refills of lithium and Depakote. Pt reports he does not have a psychiatrist at this time du to lack of ability to pay.

## 2023-03-18 NOTE — BSMART NOTE
Bsmart spoke to provider regarding patient. Provider reports patient is here for medication refills of Lithium dn Depakote. He denies SI/HI and A/V/H. Provider agrees there is no need for a Bsmart consult at this time as patient is only requesting refills and out patient resources. Multiple resources were provided by this writer.

## 2023-03-18 NOTE — ED PROVIDER NOTES
HPI     Please note that this dictation was completed with ftopia, the computer voice recognition software. Quite often unanticipated grammatical, syntax, homophones, and other interpretive errors are inadvertently transcribed by the computer software. Please disregard these errors. Please excuse any errors that have escaped final proofreading. 33-year-old male with a history of bipolar, borderline personality disorder, depression, mood disorder and others here requesting medication refill. He requests patient states he currently does not have a psychiatrist.  He was seen here March 3 and prescribed lithium and Depakote. He reports that he does not have any additional pills to take tonight. He is in the process of arranging to see a psychiatrist but does not have an appointment. He states that he went to  [facility] and told that they did not have any openings. Denies any SI or HI. Review of prior ED records show that last level checked of medications was December and the lithium level at that time was high.     Past Medical History:   Diagnosis Date    ADHD (attention deficit hyperactivity disorder)     Anxiety     Bipolar affective disorder (HCC)     Borderline personality disorder (HCC)     Depression     Mood disorder (HCC)     Substance abuse ([facility] [address]     Suicidal thoughts        Past Surgical History:   Procedure Laterality Date    HX WISDOM TEETH EXTRACTION           Family History:   Problem Relation Age of Onset    Arthritis-rheumatoid Maternal Grandmother     Parkinsonism Maternal Grandmother     Diabetes Maternal Grandfather     Other Paternal Grandmother         brain tumor    Pacemaker Paternal Grandfather     No Known Problems Mother     No Known Problems Father     Heart Disease Brother 25        Congestive Heart Failure  - Sudden onset    No Known Problems Brother        Social History     Socioeconomic History    Marital status: SINGLE     Spouse name: Not on file    Number of children: Not on file    Years of education: Not on file    Highest education level: Not on file   Occupational History    Not on file   Tobacco Use    Smoking status: Some Days     Packs/day: 0.50     Types: Cigarettes     Last attempt to quit: 2016     Years since quittin.8    Smokeless tobacco: Never    Tobacco comments:     \"smokes cigars\"   Vaping Use    Vaping Use: Never used   Substance and Sexual Activity    Alcohol use: Yes     Alcohol/week: 1.0 standard drink     Types: 1 Cans of beer per week    Drug use: No    Sexual activity: Not Currently   Other Topics Concern     Service Not Asked    Blood Transfusions Not Asked    Caffeine Concern Not Asked    Occupational Exposure Not Asked    Hobby Hazards Not Asked    Sleep Concern Not Asked    Stress Concern Not Asked    Weight Concern Not Asked    Special Diet Not Asked    Back Care Not Asked    Exercise Not Asked    Bike Helmet Not Asked    Seat Belt Not Asked    Self-Exams Not Asked   Social History Narrative    Not on file     Social Determinants of Health     Financial Resource Strain: Not on file   Food Insecurity: Not on file   Transportation Needs: Not on file   Physical Activity: Not on file   Stress: Not on file   Social Connections: Not on file   Intimate Partner Violence: Not on file   Housing Stability: Not on file         ALLERGIES: Patient has no known allergies. Review of Systems   Constitutional:  Negative for chills and fever. Gastrointestinal:  Negative for nausea and vomiting. Psychiatric/Behavioral:  Negative for hallucinations, self-injury and suicidal ideas. Vitals:    23 1703   BP: 128/62   Pulse: 63   Resp: 16   Temp: 98 °F (36.7 °C)   SpO2: 96%            Physical Exam  Vitals and nursing note reviewed. Constitutional:       Appearance: Normal appearance. He is well-developed. HENT:      Head: Normocephalic and atraumatic. Nose: No congestion or rhinorrhea.       Mouth/Throat:      Mouth: Mucous membranes are moist.   Eyes:      General: No scleral icterus. Right eye: No discharge. Left eye: No discharge. Conjunctiva/sclera: Conjunctivae normal.      Pupils: Pupils are equal, round, and reactive to light. Cardiovascular:      Rate and Rhythm: Normal rate and regular rhythm. Heart sounds: Normal heart sounds. No murmur heard. No friction rub. No gallop. Pulmonary:      Effort: Pulmonary effort is normal. No respiratory distress. Breath sounds: Normal breath sounds. No wheezing or rales. Abdominal:      General: There is no distension. Palpations: Abdomen is soft. Tenderness: There is no abdominal tenderness. There is no guarding. Musculoskeletal:         General: No swelling or deformity. Normal range of motion. Cervical back: Normal range of motion and neck supple. No rigidity. Right lower leg: No edema. Left lower leg: No edema. Lymphadenopathy:      Cervical: No cervical adenopathy. Skin:     General: Skin is warm and dry. Coloration: Skin is not jaundiced or pale. Findings: No rash. Neurological:      Mental Status: He is alert and oriented to person, place, and time. Comments: KRYSTIN   Psychiatric:      Comments: Anxious appearing. Linear and logical thoughts. Medical Decision Making  25-year-old male here requesting medication refill of Depakote and lithium. He does have a history of an elevated lithium level in December. That is the last lithium level check. He does not have any follow-up currently with psychiatry and in the process of arranging such. He does not have a current psychiatrist.  I explained that I am unwilling to refill his medications for any time Without checking recent level which we will check today. Amount and/or Complexity of Data Reviewed  Labs: ordered. Risk  Prescription drug management.            Procedures        5:53 PM  Discussed with be smart regarding possible resources for follow-up. He will fax over a list and mentioned [facility] that would be the likely best resource as well as following up with [facility][1]. 1900    Change of shift. Care of patient signed over to Dr. Dewayne Lam. Bedside handoff complete. Awaiting depakote level.

## 2023-03-18 NOTE — ED PROVIDER NOTES
7:19 PM  Change of shift. Care of patient taken over from Dr. Donna Pettit; H&P reviewed, bedside handoff complete. Awaiting Depakote level      VITAL SIGNS:  Patient Vitals for the past 4 hrs:   Pulse Resp BP SpO2   03/18/23 2149 (!) 55 18 (!) 126/91 100 %   03/18/23 2148 -- -- (!) 126/91 --           LABS:  The Following labs have been ordered while in the emergency department and I have independently evaluated them. No results found for this or any previous visit (from the past 6 hour(s)). IMAGING:  The Following imaging studies have been ordered while in the emergency department and I have reviewed them. No orders to display         Medications During Visit:  I ordered/approved the ordering of the following medicines for the patient while in the emergency department. Medications - No data to display      DECISION MAKING:  Sarai Ewing is a 27 y.o. male who comes in as above. Well-appearing patient. Depakote level unremarkable. Will discharge patient home with short course of his refills with instructions to follow-up with mental health as the ER is not appropriate place to get his refills      IMPRESSION:  1. Medication refill        DISPOSITION:  Discharged      Discharge Medication List as of 3/18/2023  9:39 PM        CONTINUE these medications which have CHANGED    Details   divalproex ER (DEPAKOTE ER) 500 mg ER tablet Take 3 Tablets by mouth nightly for 14 days. Indications: arpit associated with bipolar disorder, Normal, Disp-42 Tablet, R-0      lithium carbonate 150 mg capsule Take 4 Capsules by mouth nightly for 14 days. 4 CAP EVERY NIGHT  Indications: arpit associated with bipolar disorder, Normal, Disp-56 Capsule, R-0           CONTINUE these medications which have NOT CHANGED    Details   buPROPion XL (Wellbutrin XL) 150 mg tablet Take 1 Tablet by mouth daily. , Normal, Disp-30 Tablet, R-0      cyclobenzaprine (FLEXERIL) 5 mg tablet Take 1 Tablet by mouth three (3) times daily as needed for Muscle Spasm(s). , Normal, Disp-10 Tablet, R-0      lidocaine (Lidoderm) 5 % Apply patch to the affected area for 12 hours a day and remove for 12 hours a day., Normal, Disp-5 Each, R-0      QUEtiapine (SEROqueL) 25 mg tablet Take 1 Tablet by mouth nightly., Normal, Disp-60 Tablet, R-1      levothyroxine (SYNTHROID) 50 mcg tablet Take 1 Tablet by mouth Daily (before breakfast). , Normal, Disp-90 Tablet, R-3      hydrOXYzine pamoate (VISTARIL) 50 mg capsule TAKE 1 CAPSULE BY MOUTH TWICE DAILY AS NEEDED FOR ANXIETY, Historical Med              Follow-up Information       Follow up With Specialties Details Why Contact Info    [facility] [address]  Schedule an appointment as soon as possible for a visit   [address] [facility] [id number]  [phone]    [address] [place] [address]  Schedule an appointment as soon as possible for a visit   Jonelle Blackwood   [phone]              The patient is asked to follow-up with their primary care provider in the next several days. They are to call tomorrow for an appointment. The patient is asked to return promptly for any increased concerns or worsening of symptoms. They can return to this emergency department or any other emergency department.

## 2023-06-24 RX ORDER — LEVOTHYROXINE SODIUM 0.05 MG/1
TABLET ORAL
Qty: 90 TABLET | Refills: 0 | Status: SHIPPED | OUTPATIENT
Start: 2023-06-24

## 2023-09-26 ENCOUNTER — TELEPHONE (OUTPATIENT)
Age: 33
End: 2023-09-26

## 2023-09-26 RX ORDER — LEVOTHYROXINE SODIUM 0.05 MG/1
TABLET ORAL
Qty: 90 TABLET | Refills: 0 | Status: SHIPPED | OUTPATIENT
Start: 2023-09-26

## 2023-09-26 NOTE — TELEPHONE ENCOUNTER
Pt was called and made aware that due to not having an appt or labs since 2021, he will need to be seen. Pt then voiced understanding and asked for an appt. Pt's call was then transferred to the [facility].     Pt was also encourage to request refills from his pcp

## 2023-09-26 NOTE — TELEPHONE ENCOUNTER
9/26/2023  10:58 AM      Pt called back stating he has made an apt and would like to know what he can do about getting the medication he needs till he is seen in dec. Pt's call back number is [phone].     Thank you,  Chandra Lindquist

## 2023-12-26 RX ORDER — LEVOTHYROXINE SODIUM 0.05 MG/1
TABLET ORAL
Qty: 90 TABLET | Refills: 0 | Status: SHIPPED | OUTPATIENT
Start: 2023-12-26

## 2023-12-29 ENCOUNTER — OFFICE VISIT (OUTPATIENT)
Age: 33
End: 2023-12-29
Payer: COMMERCIAL

## 2023-12-29 VITALS
BODY MASS INDEX: 24.48 KG/M2 | HEART RATE: 76 BPM | HEIGHT: 67 IN | RESPIRATION RATE: 16 BRPM | OXYGEN SATURATION: 97 % | SYSTOLIC BLOOD PRESSURE: 121 MMHG | WEIGHT: 156 LBS | DIASTOLIC BLOOD PRESSURE: 63 MMHG

## 2023-12-29 DIAGNOSIS — R73.01 ELEVATED FASTING GLUCOSE: Primary | ICD-10-CM

## 2023-12-29 DIAGNOSIS — E03.9 HYPOTHYROIDISM, UNSPECIFIED TYPE: ICD-10-CM

## 2023-12-29 PROCEDURE — 99214 OFFICE O/P EST MOD 30 MIN: CPT | Performed by: INTERNAL MEDICINE

## 2023-12-29 NOTE — PROGRESS NOTES
Chief Complaint   Patient presents with    Letter for School/Work    Thyroid Problem        History of Present Illness: Dejah Hermosillo is a  26 y.o. male with a past medical hx significant for ADHD, bipolar d/o [facility]. 04/12/2021:   Notes decreased energy for 9 months- 1 year. Felt like he didn't have any energy. Low libido, whole body didn't feel like himself. Went and had blood levels tested by psychiatry. Saw [facility] and they were abnormal there as well. Had an ultrasound which  was without nodules. Has gained weight since this diagnosis. Energy levels are so low he's not going anywhere in life. Has taken bentyl in the past for constipation, uses a stool softener. Endorses a little shakiness in his hands. Main symptom is nervousness,  anxiety. Has worked through it- impacting social life- not able to get out as much as he wants to. No recent CT scans. Psychiatrist is Deo Anderson MD.       06/17/2021: Is taking levothyroxine 50mcg- with other meds first thing in the AM. Is not taking calcium/iron/magnesium. There were some issues with lithium creatinine levels and the lithium level  is being adjusted. 12/29/2023: No new supplements- never misses levothyroxine, otherwise no concerns.        Current Outpatient Medications:     levothyroxine (SYNTHROID) 50 MCG tablet, TAKE 1 TABLET BY MOUTH DAILY BEFORE BREAKFAST, Disp: 90 tablet, Rfl: 0    buPROPion (WELLBUTRIN XL) 150 MG extended release tablet, Take 1 tablet by mouth daily, Disp: , Rfl:     divalproex (DEPAKOTE ER) 500 MG extended release tablet, Take 3 tablets by mouth, Disp: , Rfl:     hydrOXYzine pamoate (VISTARIL) 50 MG capsule, TAKE 1 CAPSULE BY MOUTH TWICE DAILY AS NEEDED FOR ANXIETY, Disp: , Rfl:     lithium 150 MG capsule, Take 4 capsules by mouth, Disp: , Rfl:       no biotin       Social Hx: working at [employer]   Occasional cigar   No alcohol

## 2024-01-23 LAB
HBA1C MFR BLD: 5.2 % (ref 4.8–5.6)
T4 FREE SERPL-MCNC: 1.18 NG/DL (ref 0.82–1.77)
TSH SERPL DL<=0.005 MIU/L-ACNC: 1.28 UIU/ML (ref 0.45–4.5)

## 2024-01-27 DIAGNOSIS — R73.01 ELEVATED FASTING GLUCOSE: Primary | ICD-10-CM

## 2024-01-27 DIAGNOSIS — E03.9 HYPOTHYROIDISM, UNSPECIFIED TYPE: ICD-10-CM

## 2024-01-27 RX ORDER — LEVOTHYROXINE SODIUM 0.05 MG/1
TABLET ORAL
Qty: 90 TABLET | Refills: 3 | Status: SHIPPED | OUTPATIENT
Start: 2024-01-27

## 2024-12-16 ENCOUNTER — OFFICE VISIT (OUTPATIENT)
Age: 34
End: 2024-12-16
Payer: COMMERCIAL

## 2024-12-16 VITALS
SYSTOLIC BLOOD PRESSURE: 102 MMHG | HEART RATE: 56 BPM | BODY MASS INDEX: 25.11 KG/M2 | WEIGHT: 160 LBS | DIASTOLIC BLOOD PRESSURE: 63 MMHG | HEIGHT: 67 IN

## 2024-12-16 DIAGNOSIS — R73.01 ELEVATED FASTING GLUCOSE: ICD-10-CM

## 2024-12-16 DIAGNOSIS — E03.9 HYPOTHYROIDISM, UNSPECIFIED TYPE: Primary | ICD-10-CM

## 2024-12-16 DIAGNOSIS — E03.9 HYPOTHYROIDISM, UNSPECIFIED TYPE: ICD-10-CM

## 2024-12-16 PROCEDURE — G2211 COMPLEX E/M VISIT ADD ON: HCPCS | Performed by: INTERNAL MEDICINE

## 2024-12-16 PROCEDURE — 99214 OFFICE O/P EST MOD 30 MIN: CPT | Performed by: INTERNAL MEDICINE

## 2024-12-16 RX ORDER — LEVOTHYROXINE SODIUM 50 UG/1
TABLET ORAL
Qty: 90 TABLET | Refills: 3 | Status: SHIPPED | OUTPATIENT
Start: 2024-12-16

## 2025-01-10 ENCOUNTER — APPOINTMENT (OUTPATIENT)
Facility: HOSPITAL | Age: 35
End: 2025-01-10
Payer: COMMERCIAL

## 2025-01-10 ENCOUNTER — HOSPITAL ENCOUNTER (EMERGENCY)
Facility: HOSPITAL | Age: 35
Discharge: HOME | End: 2025-01-10
Attending: STUDENT IN AN ORGANIZED HEALTH CARE EDUCATION/TRAINING PROGRAM
Payer: COMMERCIAL

## 2025-01-10 VITALS
OXYGEN SATURATION: 100 % | WEIGHT: 155 LBS | RESPIRATION RATE: 18 BRPM | TEMPERATURE: 97.7 F | HEART RATE: 60 BPM | SYSTOLIC BLOOD PRESSURE: 139 MMHG | BODY MASS INDEX: 24.33 KG/M2 | HEIGHT: 67 IN | DIASTOLIC BLOOD PRESSURE: 79 MMHG

## 2025-01-10 DIAGNOSIS — S69.91XA INJURY OF RIGHT THUMB, INITIAL ENCOUNTER: Primary | ICD-10-CM

## 2025-01-10 PROCEDURE — 73140 X-RAY EXAM OF FINGER(S): CPT

## 2025-01-10 PROCEDURE — 99283 EMERGENCY DEPT VISIT LOW MDM: CPT

## 2025-01-10 ASSESSMENT — PAIN DESCRIPTION - PAIN TYPE: TYPE: ACUTE PAIN

## 2025-01-10 ASSESSMENT — PAIN DESCRIPTION - FREQUENCY: FREQUENCY: CONTINUOUS

## 2025-01-10 ASSESSMENT — PAIN - FUNCTIONAL ASSESSMENT
PAIN_FUNCTIONAL_ASSESSMENT: ACTIVITIES ARE NOT PREVENTED
PAIN_FUNCTIONAL_ASSESSMENT: 0-10

## 2025-01-10 ASSESSMENT — PAIN SCALES - GENERAL: PAINLEVEL_OUTOF10: 4

## 2025-01-10 ASSESSMENT — PAIN DESCRIPTION - ORIENTATION: ORIENTATION: RIGHT

## 2025-01-10 ASSESSMENT — PAIN DESCRIPTION - LOCATION: LOCATION: FINGER (COMMENT WHICH ONE)

## 2025-01-10 ASSESSMENT — PAIN DESCRIPTION - ONSET: ONSET: ON-GOING

## 2025-01-10 ASSESSMENT — LIFESTYLE VARIABLES
HOW OFTEN DO YOU HAVE A DRINK CONTAINING ALCOHOL: NEVER
HOW MANY STANDARD DRINKS CONTAINING ALCOHOL DO YOU HAVE ON A TYPICAL DAY: PATIENT DOES NOT DRINK

## 2025-01-10 ASSESSMENT — PAIN DESCRIPTION - DESCRIPTORS: DESCRIPTORS: ACHING

## 2025-01-11 NOTE — DISCHARGE INSTRUCTIONS
Discussed visit today. Please rest, ice, compression with the brace and elevation at home. Take it easy over the next few days with lifting anything heavy. You can take tylenol and motrin for the pain.     Return to the ER with any worsening of symptoms.

## 2025-01-11 NOTE — ED TRIAGE NOTES
Patient arrives with c/o right hand thumb pain today while at work. Patient reports he was pushing a box of apples that was stuck to anther box and hyperextended his thumb. Denies taking anything for pain.